# Patient Record
Sex: FEMALE | Race: WHITE | NOT HISPANIC OR LATINO | Employment: FULL TIME | URBAN - METROPOLITAN AREA
[De-identification: names, ages, dates, MRNs, and addresses within clinical notes are randomized per-mention and may not be internally consistent; named-entity substitution may affect disease eponyms.]

---

## 2022-05-24 ENCOUNTER — TRANSFERRED RECORDS (OUTPATIENT)
Dept: MULTI SPECIALTY CLINIC | Facility: CLINIC | Age: 62
End: 2022-05-24

## 2022-05-24 LAB — PAP-ABSTRACT: NORMAL

## 2023-05-30 LAB
ALT SERPL-CCNC: 21 U/L
AST SERPL-CCNC: 24 U/L
CREATININE (EXTERNAL): 0.85 MG/DL (ref 0.55–1.02)
GFR ESTIMATED (EXTERNAL): >60 ML/MIN/1.73M2
GLUCOSE (EXTERNAL): 103 MG/DL (ref 70–100)
POTASSIUM (EXTERNAL): 4.1 MMOL/L (ref 3.5–5.1)

## 2024-04-26 ENCOUNTER — TELEPHONE (OUTPATIENT)
Dept: FAMILY MEDICINE | Facility: CLINIC | Age: 64
End: 2024-04-26
Payer: COMMERCIAL

## 2024-04-26 DIAGNOSIS — I10 PRIMARY HYPERTENSION: Primary | ICD-10-CM

## 2024-04-26 PROBLEM — E78.00 HYPERCHOLESTEREMIA: Status: ACTIVE | Noted: 2022-03-10

## 2024-04-26 PROBLEM — G43.109 MIGRAINE HEADACHE WITH AURA: Status: ACTIVE | Noted: 2022-03-08

## 2024-04-26 PROBLEM — E03.9 HYPOTHYROIDISM: Status: ACTIVE | Noted: 2022-03-08

## 2024-04-26 RX ORDER — CARVEDILOL 25 MG/1
25 TABLET ORAL 2 TIMES DAILY
Qty: 180 TABLET | Refills: 0 | Status: SHIPPED | OUTPATIENT
Start: 2024-04-26 | End: 2024-05-10

## 2024-04-26 RX ORDER — CARVEDILOL 25 MG/1
25 TABLET ORAL 2 TIMES DAILY
COMMUNITY
Start: 2023-06-16 | End: 2024-04-26

## 2024-04-26 NOTE — TELEPHONE ENCOUNTER
Left detailed VM that 90-day Rx was sent in to [facility] [place], [place].     Tila Hayes on 4/26/2024 at 9:53 AM

## 2024-04-26 NOTE — TELEPHONE ENCOUNTER
Prescription sent x90 days. Further refills can be sent at upcoming appointment. Please let pt know  Alicia Che PA-C on 4/26/2024 at 9:45 AM

## 2024-04-26 NOTE — TELEPHONE ENCOUNTER
Medication Refill    What medication are you calling about (include dose and sig)?:   Carvedilol 25MG  1 tablet twice daily    Preferred Pharmacy:  [id number] - [place] - [address] AT [address]  P: [phone]  F: [phone]  Address    [address] [address]   Store number: [id number]   Near the intersection of: [address]    Operation       Hours: Not open 24 hours   E-Prescribing   E-Prescribing controlled substances   Mode: Retail   Type: External      Controlled Substance Agreement on file:   CSA -- Patient Level:    CSA: None found at the patient level.     Who prescribed the medication?:   Ashleigh Parra NP   [facility]    Do you need a refill? Yes    When did you use the medication last?   2 pills remaining - Out of medication    Patient offered an appointment? No  Scheduled to establish care 5/10/24.     Do you have any questions or concerns?  No    Could we send this information to you in [facility] or would you prefer to receive a phone call?:   Patient would prefer a phone call     Okay to leave a detailed message?: Yes at Cell number on file:    Telephone Information:   Mobile [phone]     Tila Hayes on 4/26/2024 at 9:38 AM

## 2024-05-10 ENCOUNTER — OFFICE VISIT (OUTPATIENT)
Dept: FAMILY MEDICINE | Facility: CLINIC | Age: 64
End: 2024-05-10
Payer: COMMERCIAL

## 2024-05-10 VITALS
OXYGEN SATURATION: 95 % | HEART RATE: 55 BPM | WEIGHT: 137 LBS | SYSTOLIC BLOOD PRESSURE: 136 MMHG | RESPIRATION RATE: 12 BRPM | DIASTOLIC BLOOD PRESSURE: 80 MMHG | TEMPERATURE: 98.2 F

## 2024-05-10 DIAGNOSIS — R21 RASH: ICD-10-CM

## 2024-05-10 DIAGNOSIS — G93.9 WHITE MATTER LESION OF CENTRAL NERVOUS SYSTEM: ICD-10-CM

## 2024-05-10 DIAGNOSIS — R79.89 ELEVATED SERUM CREATININE: ICD-10-CM

## 2024-05-10 DIAGNOSIS — H53.9 VISION CHANGES: ICD-10-CM

## 2024-05-10 DIAGNOSIS — G62.9 NEUROPATHY: ICD-10-CM

## 2024-05-10 DIAGNOSIS — E03.9 HYPOTHYROIDISM, UNSPECIFIED TYPE: Primary | ICD-10-CM

## 2024-05-10 DIAGNOSIS — I10 PRIMARY HYPERTENSION: ICD-10-CM

## 2024-05-10 DIAGNOSIS — E55.9 VITAMIN D DEFICIENCY: ICD-10-CM

## 2024-05-10 DIAGNOSIS — E78.00 HYPERCHOLESTEREMIA: ICD-10-CM

## 2024-05-10 DIAGNOSIS — R20.0 LEFT SIDED NUMBNESS: ICD-10-CM

## 2024-05-10 LAB
ALBUMIN SERPL BCG-MCNC: 4.3 G/DL (ref 3.5–5.2)
ALP SERPL-CCNC: 90 U/L (ref 40–150)
ALT SERPL W P-5'-P-CCNC: 22 U/L (ref 0–50)
ANION GAP SERPL CALCULATED.3IONS-SCNC: 10 MMOL/L (ref 7–15)
AST SERPL W P-5'-P-CCNC: 32 U/L (ref 0–45)
BILIRUB SERPL-MCNC: 0.3 MG/DL
BUN SERPL-MCNC: 9.5 MG/DL (ref 8–23)
CALCIUM SERPL-MCNC: 9.5 MG/DL (ref 8.8–10.2)
CHLORIDE SERPL-SCNC: 105 MMOL/L (ref 98–107)
CHOLEST SERPL-MCNC: 228 MG/DL
CREAT SERPL-MCNC: 1.11 MG/DL (ref 0.51–0.95)
DEPRECATED HCO3 PLAS-SCNC: 26 MMOL/L (ref 22–29)
EGFRCR SERPLBLD CKD-EPI 2021: 55 ML/MIN/1.73M2
ERYTHROCYTE [DISTWIDTH] IN BLOOD BY AUTOMATED COUNT: 13.1 % (ref 10–15)
FASTING STATUS PATIENT QL REPORTED: YES
FASTING STATUS PATIENT QL REPORTED: YES
GLUCOSE SERPL-MCNC: 97 MG/DL (ref 70–99)
HBA1C MFR BLD: 5.4 % (ref 0–5.6)
HCT VFR BLD AUTO: 42.5 % (ref 35–47)
HDLC SERPL-MCNC: 46 MG/DL
HGB BLD-MCNC: 14 G/DL (ref 11.7–15.7)
LDLC SERPL CALC-MCNC: 150 MG/DL
MCH RBC QN AUTO: 30.3 PG (ref 26.5–33)
MCHC RBC AUTO-ENTMCNC: 32.9 G/DL (ref 31.5–36.5)
MCV RBC AUTO: 92 FL (ref 78–100)
NONHDLC SERPL-MCNC: 182 MG/DL
PLATELET # BLD AUTO: 207 10E3/UL (ref 150–450)
POTASSIUM SERPL-SCNC: 4.2 MMOL/L (ref 3.4–5.3)
PROT SERPL-MCNC: 7 G/DL (ref 6.4–8.3)
RBC # BLD AUTO: 4.62 10E6/UL (ref 3.8–5.2)
SODIUM SERPL-SCNC: 141 MMOL/L (ref 135–145)
TRIGL SERPL-MCNC: 159 MG/DL
TSH SERPL DL<=0.005 MIU/L-ACNC: 1.28 UIU/ML (ref 0.3–4.2)
VIT B12 SERPL-MCNC: 498 PG/ML (ref 232–1245)
VIT D+METAB SERPL-MCNC: 43 NG/ML (ref 20–50)
WBC # BLD AUTO: 4.7 10E3/UL (ref 4–11)

## 2024-05-10 PROCEDURE — 80053 COMPREHEN METABOLIC PANEL: CPT | Performed by: PHYSICIAN ASSISTANT

## 2024-05-10 PROCEDURE — 36415 COLL VENOUS BLD VENIPUNCTURE: CPT | Performed by: PHYSICIAN ASSISTANT

## 2024-05-10 PROCEDURE — 82607 VITAMIN B-12: CPT | Performed by: PHYSICIAN ASSISTANT

## 2024-05-10 PROCEDURE — 84443 ASSAY THYROID STIM HORMONE: CPT | Performed by: PHYSICIAN ASSISTANT

## 2024-05-10 PROCEDURE — 99204 OFFICE O/P NEW MOD 45 MIN: CPT | Performed by: PHYSICIAN ASSISTANT

## 2024-05-10 PROCEDURE — 82306 VITAMIN D 25 HYDROXY: CPT | Performed by: PHYSICIAN ASSISTANT

## 2024-05-10 PROCEDURE — 80061 LIPID PANEL: CPT | Performed by: PHYSICIAN ASSISTANT

## 2024-05-10 PROCEDURE — 83036 HEMOGLOBIN GLYCOSYLATED A1C: CPT | Performed by: PHYSICIAN ASSISTANT

## 2024-05-10 PROCEDURE — 85027 COMPLETE CBC AUTOMATED: CPT | Performed by: PHYSICIAN ASSISTANT

## 2024-05-10 RX ORDER — MULTIVIT-MIN/IRON/FOLIC ACID/K 18-600-40
2000 CAPSULE ORAL DAILY
COMMUNITY
End: 2024-05-10

## 2024-05-10 RX ORDER — LEVOTHYROXINE SODIUM 75 MCG
75 TABLET ORAL DAILY
COMMUNITY
Start: 2024-04-22 | End: 2024-05-13

## 2024-05-10 RX ORDER — ACETAMINOPHEN 160 MG
2000 TABLET,DISINTEGRATING ORAL DAILY
COMMUNITY

## 2024-05-10 RX ORDER — IBUPROFEN 200 MG
TABLET ORAL
COMMUNITY

## 2024-05-10 RX ORDER — CARVEDILOL 25 MG/1
25 TABLET ORAL 2 TIMES DAILY
Qty: 180 TABLET | Refills: 3 | Status: SHIPPED | OUTPATIENT
Start: 2024-05-10

## 2024-05-10 ASSESSMENT — PAIN SCALES - GENERAL: PAINLEVEL: SEVERE PAIN (6)

## 2024-05-10 NOTE — Clinical Note
Please abstract the following data from this visit with this patient into the appropriate field in Epic:  Tests that can be patient reported without a hard copy:  Pap smear done on this date: 5/2022 (approximately), by this group: [facility], results were Normal.   Other Tests found in the patient's chart through Chart Review/Care Everywhere:  {Abstract Quality List (Optional):298470}  Note to Abstraction: If this section is blank, no results were found via Chart Review/Care Everywhere.

## 2024-05-10 NOTE — PROGRESS NOTES
Assessment & Plan     Hypothyroidism, unspecified type  Chronic issue. Stable on synthroid 75 mcg for several years. Recheck TSH and will refill prescription once results available, adjust dose if needed.   - TSH with free T4 reflex    Primary hypertension  Controlled on carvedilol, refill provided.   - refill carvedilol (COREG) 25 MG tablet  Dispense: 180 tablet; Refill: 3  - Comprehensive metabolic panel (BMP + Alb, Alk Phos, ALT, AST, Total. Bili, TP)  - CBC with platelets    Hypercholesteremia  Recheck lipids today.   - Lipid panel reflex to direct LDL Fasting    White matter lesion of central nervous system  Left sided numbness  Neuropathy  Vision changes  Has seen neurology in the past, ongoing symptoms of left sided neuropathic pain/numbness/tingling in setting of some white matter lesions noted on brain MRI in 2022, stable from 2019. Does have a history of migraines. Referred back to neurology for further evaluation and management. She did not tolerate gabapentin in the past for these symptoms due to GI side effects. Will check B12 and A1c as well. I also recommended seeing ophthal for vision changes she has noticed - she will check with insurance and let me know if referral is needed.   - Adult Neurology  Referral  - Vitamin B12  - Hemoglobin A1c    Vitamin D deficiency  History of low vit D level. On 2000 international unit(s) once daily currently   - Vitamin D Deficiency    Rash  Recurrent rash for several years. Most recently flared after having influenza. Is not itchy or bothersome. Advised monitoring for now and referred to derm per patient request.   - Adult Dermatology  Referral    Follow up in September/October for annual exam, patient declined any preventive care orders today due to wanting to focus on above issues.     Alicia Che PA-C on 5/10/2024 at 9:19 AM        Phillip Lau is a 64 year old, presenting for the following health issues:  Establish Care         5/10/2024     9:07 AM   Additional Questions   Roomed by Ramu CAMERON     History of Present Illness       Reason for visit:  Establish primary care    She eats 2-3 servings of fruits and vegetables daily.She consumes 1 sweetened beverage(s) daily.She exercises with enough effort to increase her heart rate 60 or more minutes per day.  She exercises with enough effort to increase her heart rate 7 days per week.   She is taking medications regularly.       Hypertension Follow-up    Do you check your blood pressure regularly outside of the clinic? No   Are you following a low salt diet? Yes  Are your blood pressures ever more than 140 on the top number (systolic) OR more   than 90 on the bottom number (diastolic), for example 140/90? NA    Hypothyroidism Follow-up    Since last visit, patient describes the following symptoms: Skin changes       Patient presents new to me to establish care    PMH reviewed:  - hypertension - on carvedilol 25 mg BID  - hypothyroidism - on synthroid 75 mcg daily, stable for awhile   - hyperlipidemia   - migraines w/ aura - has seen [facility] of neurology in the past, last in 10/2022. Coreg for prevention as well as hypertension treatment, does not tolerate triptans    Left sided numbness and burning pain noted in neuro notes from , EMG ordered but I don't see results from this. Started on gabapentin for neuropathy at that visit - horrible GI side effects. Has been told possible MS. Gets intermittent vision changes/blurring     Other issues:  Bladder spasms x6 months  Rash on torso since having influenza   Exhausted, doesn't sleep well. Works at [employer] - loves it  History of vit D def - on 2000 international unit(s) daily         Objective    /80   Pulse 55   Temp 98.2  F (36.8  C) (Tympanic)   Resp 12   Wt 62.1 kg (137 lb)   SpO2 95%   There is no height or weight on file to calculate BMI.  Physical Exam   GENERAL: alert and no distress  RESP: lungs clear to  auscultation - no rales, rhonchi or wheezes  CV: regular rate and rhythm, normal S1 S2, no S3 or S4, no murmur, click or rub, no peripheral edema  MS: no gross musculoskeletal defects noted, no edema  SKIN: no suspicious lesions or rashes  NEURO: Normal strength and tone, mentation intact and speech normal  PSYCH: mentation appears normal, affect normal/bright        Signed Electronically by: Alicia Che PA-C on 5/10/2024 at 9:19 AM

## 2024-05-13 ENCOUNTER — TELEPHONE (OUTPATIENT)
Dept: FAMILY MEDICINE | Facility: CLINIC | Age: 64
End: 2024-05-13
Payer: COMMERCIAL

## 2024-05-13 RX ORDER — LEVOTHYROXINE SODIUM 75 UG/1
75 TABLET ORAL DAILY
Qty: 90 TABLET | Refills: 3 | Status: SHIPPED | OUTPATIENT
Start: 2024-05-13

## 2024-05-13 NOTE — TELEPHONE ENCOUNTER
[facility] Call Center    Phone Message    May a detailed message be left on voicemail: no     Reason for Call: Other: Pt, Judi calling about Rash that pops up for 4-6 Weeks and then disappears and will come back/ same/ Torso & Back- Got it 1 month before Influenza/ Uses Neutrogena and Cerave soaps/ Please call if we can get in sooner- [phone]     Action Taken: Other: ec skin/ Derm    Travel Screening: Not Applicable

## 2024-05-13 NOTE — RESULT ENCOUNTER NOTE
Judi,    I have reviewed your lab results below:    - electrolytes, blood sugar and liver function normal   - mild stress on the kidneys - recommend avoiding NSAID medications (ibuprofen, advil, aleve, naproxen, etc) and making sure you are staying well hydrated, let's recheck in 1 month  - cholesterol is elevated - the American Heart Association recommends you be on a cholesterol medication to decrease risk of heart attack and stroke given your cholesterol readings and other risk factors. Please let me know if you are open to starting a cholesterol medication and I will send this to the pharmacy. If you'd like to discuss further, please schedule a video visit.   - thyroid function is within normal limits, continue same dose of thyroid medication. I sent a refill to your pharmacy for the year   - vitamin D level is normal   - vitamin B12 level is normal  - A1c (3 month average of blood sugars) is normal - no diabetes or prediabetes  - blood counts are normal - normal hemoglobin/red blood cells (no anemia), normal white blood cells (infection fighting cells), normal platelets (affect ability to clot normally)        Please let me know if you have any further questions.    Take care,  Alicia Che PA-C on 5/13/2024 at 8:33 AM

## 2024-05-14 NOTE — TELEPHONE ENCOUNTER
Patient Contact    Attempt # 1    Was call answered?  No.  Left message on voicemail with information to call nurse back at [phone].    Shy LUU RN  [facility] [place]  [phone]  .

## 2024-05-16 NOTE — TELEPHONE ENCOUNTER
S/w pt who states she gets a rash 2x/year and currently has the rash.  It will last for about 2 months and then goes away on its own.  Denies itching or pain with rash. Rheumatology thought it was psoriasis.    Scheduled with Meghna Kirk on Thursday May 23rd at 8:45 am.    Shy LUU RN  [facility] [place]  [phone]

## 2024-05-19 ENCOUNTER — HEALTH MAINTENANCE LETTER (OUTPATIENT)
Age: 64
End: 2024-05-19

## 2024-05-23 ENCOUNTER — OFFICE VISIT (OUTPATIENT)
Dept: FAMILY MEDICINE | Facility: CLINIC | Age: 64
End: 2024-05-23
Payer: COMMERCIAL

## 2024-05-23 DIAGNOSIS — R21 RASH: ICD-10-CM

## 2024-05-23 DIAGNOSIS — B35.1 ONYCHOMYCOSIS: Primary | ICD-10-CM

## 2024-05-23 PROCEDURE — 99204 OFFICE O/P NEW MOD 45 MIN: CPT | Performed by: PHYSICIAN ASSISTANT

## 2024-05-23 RX ORDER — CICLOPIROX 80 MG/ML
SOLUTION TOPICAL
Qty: 6.6 ML | Refills: 11 | Status: SHIPPED | OUTPATIENT
Start: 2024-05-23

## 2024-05-23 NOTE — PROGRESS NOTES
[facility] Dermatology Note  Encounter Date: May 23, 2024  Office Visit     Reviewed patients past medical history and pertinent chart review prior to patients visit today.     Dermatology Problem List:  # Rash, Ddx: pityriasis rosea vs eczematous dermatitis vs othe  # Onychomycosis   - Penlac     Social history: Enjoys walking her dog and biking  ____________________________________________    Assessment & Plan:     # History of a rash  Ddx: pityriasis rosea vs eczematous dermatitis vs other  - We discussed the non-specific findings on exam today. We discussed the limited utility of a biopsy, which the patient kindly declined today. We also reviewed the options of empiric treatment with triamcinolone if the rash flares again to address an eczematous process, which we will hold off on for now. I recommend the patient take photos and follow up if the rash flares again in the future.     # Onychomycosis   Onychomycosis present on the bilateral feet. We discussed the difficulty associated with treating fungal infections of the nails. We discussed treatment with oral medications, nail lacquer, as well as the option to not treat. The patient elected to treat.    Ciclopirox (Penlac) was prescribed and should be applied to the infected nail and surrounding skin once daily. After 7 days the nail lacquer should be removed with alcohol and a fresh coat applied. Patient was advised used of the nail lacquer should continue until nail clearance is achieved or up to 48 weeks.      All risks, benefits and alternatives were discussed with patient.  Patient is in agreement and understands the assessment and plan.  All questions were answered.  Meghna Kirk PA-C  [facility]  _______________________________________    CC: Consult (Rash - on torso )    HPI:  Ms. Judi Leavitt is a(n) 64 year old female who presents today as a new patient for a rash. The rash has flares about two times per year  in the past few years. The rash lasts a few weeks before resolving. The rash involves the back, chest, and abdomen and is red and scaly, no pustules or ulcers. No itching or pain. The patient has not tried anything for the rash. Denies any medications, exposures or products preceding the rash. The rash has flared while the patient was sick in the past. The patient reports a history of eczema and sensitive skin. The rash last flared weeks ago and is almost completely resolved per patient.     Additionally, she wonders what options are available for toenail fungus. Patient is otherwise feeling well, without additional skin concerns.      Physical Exam:  SKIN: Focused examination of back, chest, arms and abdomen was performed.  - Fine scale with mild background erythema involving the midline lumbar back.   - Few papules with crust involving the bilateral upper back.   - Yellowing with subungual debris involving the toenail(s).     - No other lesions of concern on areas examined.         Medications:  Current Outpatient Medications   Medication Sig Dispense Refill    carvedilol (COREG) 25 MG tablet Take 1 tablet (25 mg) by mouth 2 times daily 180 tablet 3    Cholecalciferol (VITAMIN D3) 50 MCG (2000 UT) CAPS Take 2,000 Units by mouth daily      levothyroxine (SYNTHROID) 75 MCG tablet Take 1 tablet (75 mcg) by mouth daily 90 tablet 3    ibuprofen (ADVIL/MOTRIN) 200 MG tablet 600mg in the AM and 800mg in the PM       No current facility-administered medications for this visit.      Past Medical History:   Patient Active Problem List   Diagnosis    Migraine headache with aura    Hypothyroidism    Hypercholesteremia    HTN (hypertension)    Left sided numbness     History reviewed. No pertinent past medical history.    CC Referred MD Gr[place]  No address on file on close of this encounter.

## 2024-05-23 NOTE — LETTER
5/23/2024         RE: Judi Leavitt  [address]  [place] [address]        Dear Colleague,    Thank you for referring your patient, Judi Leavitt, to the [facility] [place]. Please see a copy of my visit note below.    [facility] Dermatology Note  Encounter Date: May 23, 2024  Office Visit     Reviewed patients past medical history and pertinent chart review prior to patients visit today.     Dermatology Problem List:  # Rash, Ddx: pityriasis rosea vs eczematous dermatitis vs othe  # Onychomycosis   - Penlac     Social history: Enjoys walking her dog and biking  ____________________________________________    Assessment & Plan:     # History of a rash  Ddx: pityriasis rosea vs eczematous dermatitis vs other  - We discussed the non-specific findings on exam today. We discussed the limited utility of a biopsy, which the patient kindly declined today. We also reviewed the options of empiric treatment with triamcinolone if the rash flares again to address an eczematous process, which we will hold off on for now. I recommend the patient take photos and follow up if the rash flares again in the future.     # Onychomycosis   Onychomycosis present on the bilateral feet. We discussed the difficulty associated with treating fungal infections of the nails. We discussed treatment with oral medications, nail lacquer, as well as the option to not treat. The patient elected to treat.    Ciclopirox (Penlac) was prescribed and should be applied to the infected nail and surrounding skin once daily. After 7 days the nail lacquer should be removed with alcohol and a fresh coat applied. Patient was advised used of the nail lacquer should continue until nail clearance is achieved or up to 48 weeks.      All risks, benefits and alternatives were discussed with patient.  Patient is in agreement and understands the assessment and plan.  All questions were answered.  Meghna  CONNER Kirk  [facility]at[facility]  _______________________________________    CC: Consult (Rash - on torso )    HPI:  Ms. Judi Leavitt is a(n) 64 year old female who presents today as a new patient for a rash. The rash has flares about two times per year in the past few years. The rash lasts a few weeks before resolving. The rash involves the back, chest, and abdomen and is red and scaly, no pustules or ulcers. No itching or pain. The patient has not tried anything for the rash. Denies any medications, exposures or products preceding the rash. The rash has flared while the patient was sick in the past. The patient reports a history of eczema and sensitive skin. The rash last flared weeks ago and is almost completely resolved per patient.     Additionally, she wonders what options are available for toenail fungus. Patient is otherwise feeling well, without additional skin concerns.      Physical Exam:  SKIN: Focused examination of back, chest, arms and abdomen was performed.  - Fine scale with mild background erythema involving the midline lumbar back.   - Few papules with crust involving the bilateral upper back.   - Yellowing with subungual debris involving the toenail(s).     - No other lesions of concern on areas examined.         Medications:  Current Outpatient Medications   Medication Sig Dispense Refill     carvedilol (COREG) 25 MG tablet Take 1 tablet (25 mg) by mouth 2 times daily 180 tablet 3     Cholecalciferol (VITAMIN D3) 50 MCG (2000 UT) CAPS Take 2,000 Units by mouth daily       levothyroxine (SYNTHROID) 75 MCG tablet Take 1 tablet (75 mcg) by mouth daily 90 tablet 3     ibuprofen (ADVIL/MOTRIN) 200 MG tablet 600mg in the AM and 800mg in the PM       No current facility-administered medications for this visit.      Past Medical History:   Patient Active Problem List   Diagnosis     Migraine headache with aura     Hypothyroidism     Hypercholesteremia     HTN (hypertension)     Left sided  numbness     History reviewed. No pertinent past medical history.    CC Referred Self, MD  No address on file on close of this encounter.       Again, thank you for allowing me to participate in the care of your patient.        Sincerely,        Meghna Kirk PA-C

## 2024-05-23 NOTE — PATIENT INSTRUCTIONS
Patient Education       Proper skin care from [facility]:    -Eliminate harsh soaps as they strip the natural oils from the skin, often resulting in dry itchy skin ( i.e. Dial, Zest, Faroese Spring)  -Use mild soaps such as Cetaphil or Dove Sensitive Skin in the shower. You do not need to use soap on arms, legs, and trunk every time you shower unless visibly soiled.   -Avoid hot or cold showers.  -After showering, lightly dry off and apply moisturizing within 2-3 minutes. This will help trap moisture in the skin.   -Aggressive use of a moisturizer at least 1-2 times a day to the entire body (including -Vanicream, Cetaphil, Aquaphor or Cerave) and moisturize hands after every washing.  -We recommend using moisturizers that come in a tub that needs to be scooped out, not a pump. This has more of an oil base. It will hold moisture in your skin much better than a water base moisturizer. The above recommended are non-pore clogging.      Wear a sunscreen with at least SPF 30 on your face, ears, neck and V of the chest daily. Wear sunscreen on other areas of the body if those areas are exposed to the sun throughout the day. Sunscreens can contain physical and/or chemical blockers. Physical blockers are less likely to clog pores, these include zinc oxide and titanium dioxide. Reapply every two hour and after swimming.     Sunscreen examples: https://www.ewg.org/sunscreen/    UV radiation  UVA radiation remains constant throughout the day and throughout the year. It is a longer wavelength than UVB and therefore penetrates deeper into the skin leading to immediate and delayed tanning, photoaging, and skin cancer. 70-80% of UVA and UVB radiation occurs between the hours of 10am-2pm.  UVB radiation  UVB radiation causes the most harmful effects and is more significant during the summer months. However, snow and ice can reflect UVB radiation leading to skin damage during the winter months as well. UVB radiation is  responsible for tanning, burning, inflammation, delayed erythema (pinkness), pigmentation (brown spots), and skin cancer.     I recommend self monthly full body exams and yearly full body exams with a dermatology provider. If you develop a new or changing lesion please follow up for examination. Most skin cancers are pink and scaly or pink and pearly. However, we do see blue/brown/black skin cancers.  Consider the ABCDEs of melanoma when giving yourself your monthly full body exam ( don't forget the groin, buttocks, feet, toes, etc). A-asymmetry, B-borders, C-color, D-diameter, E-elevation or evolving. If you see any of these changes please follow up in clinic. If you cannot see your back I recommend purchasing a hand held mirror to use with a larger wall mirror.       Checking for Skin Cancer  You can find cancer early by checking your skin each month. There are 3 kinds of skin cancer. They are melanoma, basal cell carcinoma, and squamous cell carcinoma. Doing monthly skin checks is the best way to find new marks or skin changes. Follow the instructions below for checking your skin.   The ABCDEs of checking moles for melanoma   Check your moles or growths for signs of melanoma using ABCDE:   Asymmetry: the sides of the mole or growth don t match  Border: the edges are ragged, notched, or blurred  Color: the color within the mole or growth varies  Diameter: the mole or growth is larger than 6 mm (size of a pencil eraser)  Evolving: the size, shape, or color of the mole or growth is changing (evolving is not shown in the images below)    Checking for other types of skin cancer  Basal cell carcinoma or squamous cell carcinoma have symptoms such as:     A spot or mole that looks different from all other marks on your skin  Changes in how an area feels, such as itching, tenderness, or pain  Changes in the skin's surface, such as oozing, bleeding, or scaliness  A sore that does not heal  New swelling or redness beyond  the border of a mole    Who s at risk?  Anyone can get skin cancer. But you are at greater risk if you have:   Fair skin, light-colored hair, or light-colored eyes  Many moles or abnormal moles on your skin  A history of sunburns from sunlight or tanning beds  A family history of skin cancer  A history of exposure to radiation or chemicals  A weakened immune system  If you have had skin cancer in the past, you are at risk for recurring skin cancer.   How to check your skin  Do your monthly skin checkups in front of a full-length mirror. Check all parts of your body, including your:   Head (ears, face, neck, and scalp)  Torso (front, back, and sides)  Arms (tops, undersides, upper, and lower armpits)  Hands (palms, backs, and fingers, including under the nails)  Buttocks and genitals  Legs (front, back, and sides)  Feet (tops, soles, toes, including under the nails, and between toes)  If you have a lot of moles, take digital photos of them each month. Make sure to take photos both up close and from a distance. These can help you see if any moles change over time.   Most skin changes are not cancer. But if you see any changes in your skin, call your doctor right away. Only he or she can diagnose a problem. If you have skin cancer, seeing your doctor can be the first step toward getting the treatment that could save your life.   "[employer]" last reviewed this educational content on 4/1/2019 2000-2020 [employer]. [address] [place] [address], [place] [address]. All rights reserved. This information is not intended as a substitute for professional medical care. Always follow your healthcare professional's instructions.       When should I call my doctor?  If you are worsening or not improving, please, contact us or seek urgent care as noted below.     Who should I call with questions (adults)?  [facility] (adult and pediatric): [phone]  [facility]  [place] (adult): [phone]  [facility] ([place], [place], [place] and [place]) [phone]  For urgent needs outside of business hours call the [facility] at [phone] and ask for the dermatology resident on call to be paged  If this is a medical emergency and you are unable to reach an ER, Call 911      If you need a prescription refill, please contact your pharmacy. Refills are approved or denied by our Physicians during normal business hours, Monday through Fridays  Per office policy, refills will not be granted if you have not been seen within the past year (or sooner depending on your child's condition)

## 2024-06-13 ENCOUNTER — DOCUMENTATION ONLY (OUTPATIENT)
Dept: FAMILY MEDICINE | Facility: CLINIC | Age: 64
End: 2024-06-13
Payer: COMMERCIAL

## 2024-06-13 DIAGNOSIS — E78.00 HYPERCHOLESTEREMIA: Primary | ICD-10-CM

## 2024-06-13 DIAGNOSIS — R79.89 ELEVATED SERUM CREATININE: ICD-10-CM

## 2024-06-13 NOTE — PROGRESS NOTES
Judi Leavitt has an upcoming lab appointment:    Future Appointments   Date Time Provider Department Center   6/24/2024 10:30 AM [facility] LAB [facility]   7/10/2024  9:00 AM Hussain Young MD [facility] CS     Patient is scheduled for the following lab(s): repeat kidney function (per Alicia Che), UA, cholesterol    There is no order available. Please review and place either future orders or HMPO (Review of Health Maintenance Protocol Orders), as appropriate.    Health Maintenance Due   Topic    HIV SCREENING      Denise Malone

## 2024-06-24 ENCOUNTER — LAB (OUTPATIENT)
Dept: LAB | Facility: CLINIC | Age: 64
End: 2024-06-24
Attending: PHYSICIAN ASSISTANT
Payer: COMMERCIAL

## 2024-06-24 ENCOUNTER — TELEPHONE (OUTPATIENT)
Dept: FAMILY MEDICINE | Facility: CLINIC | Age: 64
End: 2024-06-24

## 2024-06-24 DIAGNOSIS — Z11.4 SCREENING FOR HIV (HUMAN IMMUNODEFICIENCY VIRUS): Primary | ICD-10-CM

## 2024-06-24 DIAGNOSIS — R79.89 ELEVATED SERUM CREATININE: ICD-10-CM

## 2024-06-24 DIAGNOSIS — E78.00 HYPERCHOLESTEREMIA: ICD-10-CM

## 2024-06-24 DIAGNOSIS — R79.89 ELEVATED SERUM CREATININE: Primary | ICD-10-CM

## 2024-06-24 LAB
ALBUMIN UR-MCNC: NEGATIVE MG/DL
ANION GAP SERPL CALCULATED.3IONS-SCNC: 11 MMOL/L (ref 7–15)
APPEARANCE UR: CLEAR
BILIRUB UR QL STRIP: NEGATIVE
BUN SERPL-MCNC: 13.4 MG/DL (ref 8–23)
CALCIUM SERPL-MCNC: 9.7 MG/DL (ref 8.8–10.2)
CHLORIDE SERPL-SCNC: 104 MMOL/L (ref 98–107)
CHOLEST SERPL-MCNC: 225 MG/DL
COLOR UR AUTO: YELLOW
CREAT SERPL-MCNC: 0.95 MG/DL (ref 0.51–0.95)
CREAT UR-MCNC: 74.5 MG/DL
DEPRECATED HCO3 PLAS-SCNC: 23 MMOL/L (ref 22–29)
EGFRCR SERPLBLD CKD-EPI 2021: 66 ML/MIN/1.73M2
FASTING STATUS PATIENT QL REPORTED: YES
FASTING STATUS PATIENT QL REPORTED: YES
GLUCOSE SERPL-MCNC: 99 MG/DL (ref 70–99)
GLUCOSE UR STRIP-MCNC: NEGATIVE MG/DL
HDLC SERPL-MCNC: 50 MG/DL
HGB UR QL STRIP: ABNORMAL
HOLD SPECIMEN: NORMAL
KETONES UR STRIP-MCNC: NEGATIVE MG/DL
LDLC SERPL CALC-MCNC: 139 MG/DL
LEUKOCYTE ESTERASE UR QL STRIP: NEGATIVE
MICROALBUMIN UR-MCNC: <12 MG/L
MICROALBUMIN/CREAT UR: NORMAL MG/G{CREAT}
NITRATE UR QL: NEGATIVE
NONHDLC SERPL-MCNC: 175 MG/DL
PH UR STRIP: 6 [PH] (ref 5–7)
POTASSIUM SERPL-SCNC: 4.1 MMOL/L (ref 3.4–5.3)
RBC #/AREA URNS AUTO: ABNORMAL /HPF
SODIUM SERPL-SCNC: 138 MMOL/L (ref 135–145)
SP GR UR STRIP: 1.01 (ref 1–1.03)
SQUAMOUS #/AREA URNS AUTO: ABNORMAL /LPF
TRIGL SERPL-MCNC: 179 MG/DL
UROBILINOGEN UR STRIP-ACNC: 0.2 E.U./DL
WBC #/AREA URNS AUTO: ABNORMAL /HPF

## 2024-06-24 PROCEDURE — 81001 URINALYSIS AUTO W/SCOPE: CPT

## 2024-06-24 PROCEDURE — 80048 BASIC METABOLIC PNL TOTAL CA: CPT

## 2024-06-24 PROCEDURE — 36415 COLL VENOUS BLD VENIPUNCTURE: CPT

## 2024-06-24 PROCEDURE — 82043 UR ALBUMIN QUANTITATIVE: CPT

## 2024-06-24 PROCEDURE — 87389 HIV-1 AG W/HIV-1&-2 AB AG IA: CPT

## 2024-06-24 PROCEDURE — 80061 LIPID PANEL: CPT

## 2024-06-24 PROCEDURE — 82570 ASSAY OF URINE CREATININE: CPT

## 2024-06-24 NOTE — TELEPHONE ENCOUNTER
Called patient and relayed providers message. Patient stated understanding and had no further questions.     Ivone CARMONA RN  [facility] Triage Team

## 2024-06-24 NOTE — TELEPHONE ENCOUNTER
Pt had lab only visit today and left a urine specimen in case UA is needed given abnormal labs results 05/10/2024. Pt denies dysuria, back/abdominal pain, fever or hematuria. Pt  has urinary frequency bladder spasms for 6 months .    GEORGI Vargas 05/10 lab note-  - mild stress on the kidneys - recommend avoiding NSAID medications (ibuprofen, advil, aleve, naproxen, etc) and making sure you are staying well hydrated, let's recheck in 1 month     Please advice if covering provider can approve UA/UC or other renal related lab orders.    Pt will need call back with providers advice. VM is confidential.

## 2024-06-25 ENCOUNTER — MYC MEDICAL ADVICE (OUTPATIENT)
Dept: FAMILY MEDICINE | Facility: CLINIC | Age: 64
End: 2024-06-25
Payer: COMMERCIAL

## 2024-06-25 LAB — HIV 1+2 AB+HIV1 P24 AG SERPL QL IA: NONREACTIVE

## 2024-06-25 NOTE — TELEPHONE ENCOUNTER
Thank you for reaching us.  Alicia  is not in office so I am trying to reply back on her behalf.  HIV test is sometimes done as a screening test for everyone ,once in a lifetime that could be the reason she has ordered that.  There is no specific reason other than screening exam.    Ming Meehan MD

## 2024-07-10 ENCOUNTER — OFFICE VISIT (OUTPATIENT)
Dept: NEUROLOGY | Facility: CLINIC | Age: 64
End: 2024-07-10
Attending: PHYSICIAN ASSISTANT
Payer: COMMERCIAL

## 2024-07-10 VITALS
BODY MASS INDEX: 18.56 KG/M2 | SYSTOLIC BLOOD PRESSURE: 158 MMHG | DIASTOLIC BLOOD PRESSURE: 93 MMHG | WEIGHT: 137 LBS | OXYGEN SATURATION: 100 % | HEIGHT: 72 IN | HEART RATE: 57 BPM

## 2024-07-10 DIAGNOSIS — G62.9 NEUROPATHY: ICD-10-CM

## 2024-07-10 DIAGNOSIS — G93.9 WHITE MATTER LESION OF CENTRAL NERVOUS SYSTEM: ICD-10-CM

## 2024-07-10 DIAGNOSIS — G43.E19 CHRONIC MIGRAINE WITH AURA, INTRACTABLE, WITHOUT STATUS MIGRAINOSUS: Primary | ICD-10-CM

## 2024-07-10 DIAGNOSIS — R20.0 LEFT SIDED NUMBNESS: ICD-10-CM

## 2024-07-10 PROCEDURE — G2211 COMPLEX E/M VISIT ADD ON: HCPCS | Performed by: PSYCHIATRY & NEUROLOGY

## 2024-07-10 PROCEDURE — 99204 OFFICE O/P NEW MOD 45 MIN: CPT | Performed by: PSYCHIATRY & NEUROLOGY

## 2024-07-10 NOTE — PATIENT INSTRUCTIONS
Your examination today shows a mild neuropathy at BOTH feet.  What I am not so sure is whether the neuropathy has spread to your hands, and especially if the left side's neuropathy is worse than the right.  Those are important questions to figure out, because it could affect my final diagnosis.    I would like you to get a few blood tests at your primary care clinic for common causes of neuropathy, and an EMG test. The wait times for EMG in [place] are currently very long, and you may consider doing it in another location if you wanted to be done before September or October. The phone number of the EMG lab is     Please sign a release of records form today so we can obtain your last brain and cervical spine MRI from [facility].  I need to review the scans in person.    I would like you to see one of our migraine specialist as well.    Follow-up in our clinic in 4 months

## 2024-07-10 NOTE — LETTER
7/10/2024      Judi Leavitt  [address]  [place] [address]      Dear Colleague,    Thank you for referring your patient, Judi Leavitt, to the [facility] NEUROLOGY [facility]. Please see a copy of my visit note below.    TITI Adams (referring provider)  [place], July 10, 2024    Dear Ms Che,    I had the pleasure to see Ms. Leavitt at the [facility] neuromuscular clinic in [place] today.  She is a 64-year-old woman with history of chronic migraine headaches with visual auras, for which she used to follow at [facility] until December 2022.  She is here mostly because of feeling of numbness and pain on both sides of the body, but mostly the left.  This problem began around 5868-4186.  At that time, she had intractable migraine headaches and frequent visual auras described as flashing lights, fractured visual field, shards, or black spots.  She woke up 1 day and her left side was numb including the chest, leg, and arm, with pain experienced in the same region.  The sensory symptoms evolved over a few hours.  She was seen in the local ED and no specific diagnosis was made.  Since that time, she has had frequent episodes of dysesthesias of her left half of the body.  In the last years, she has similar sensations to a lesser degree on the right side as well.  It seems that the sensory symptoms are not constant but rather intermittent/fluctuating.  They tend to happen also at night and she has to wake up and take a few steps and walk around to get relief.  She may have a little feeling of restlessness of her legs when she sits down watching TV as well, but it is not very prominent.  She is experience some spasms of her bladder and she has not seen urology recently.  In the past she had numerous UTIs but not in the last year.  She is suffering from urgency of urination.  3 months ago she had influenza A and this led to some temporary  increase in her creatinine, which later improved.  She also describes change in her visual symptoms in the last year with occasional episodes of double vision or feeling out of focus.    As above said, the episodes of numbness are more severe and frequent on the left side.  Her fourth and fifth finger of the left hand will frequently lock up/freeze, and she occasionally will drop things.    She describes dry eyes and dry mouth, for which she needs to use daily drops.  She does not have a history of diabetes.  She drinks 1-2 alcoholic drinks on the weekends but not during the week.  She does eat red meat once to twice a month, but mostly fruits and vegetables.  She never had cancer, radiation, chemotherapy, or bariatric surgery.  There is a family history of type 1 diabetes (son), and thyroid disorders.  She is on thyroid replacement for years.    The migraine headaches bother her less in the last year, but they still occur, and she has headaches for more than 15 days every month.  The old visual auras she had continued to occur periodically.  She has not seen a headache specialist recently.    Of note, she had a brain MRI at [facility] in October 20[facility].  There was scattered T2 hyperintense white matter lesions.  The radiologist said that they may be compatible with intracranial demyelination or migraines or remote insults.  He noted that there was no change in MRI compared to October 2019.  It should be pointed out however, that the previous radiologist read the 2019 scan was not concerned about demyelination.  MRI cervical and thoracic spine done also in October 2022 did not show any concerning spinal cord lesion.  CT of the abdomen and pelvis done in May 2023 showed a right ovarian cyst of 4 cm.  In May 2024 she had hemoglobin A1c, vitamin B12, vitamin D levels, TSH, CBC, comprehensive metabolic panel, and HIV serologies.  They were all negative or normal.  She  was previously tested for hepatitis C in 2022 and it was negative. She has not had an EMG study.      Current Outpatient Medications   Medication Sig Dispense Refill     carvedilol (COREG) 25 MG tablet Take 1 tablet (25 mg) by mouth 2 times daily 180 tablet 3     Cholecalciferol (VITAMIN D3) 50 MCG (2000 UT) CAPS Take 2,000 Units by mouth daily       ciclopirox (PENLAC) 8 % external solution Apply to adjacent skin and affected nails daily.  Remove with alcohol every 7 days, then repeat. 6.6 mL 11     ibuprofen (ADVIL/MOTRIN) 200 MG tablet 600mg in the AM and 800mg in the PM       levothyroxine (SYNTHROID) 75 MCG tablet Take 1 tablet (75 mcg) by mouth daily 90 tablet 3     No current facility-administered medications for this visit.          Allergies   Allergen Reactions     Corticosteroids      Gabapentin GI Disturbance     Indigestion, bloating, pain.     Levofloxacin Confusion     Prednisone Other (See Comments)     Psychotic reaction     BP (!) 158/93   Pulse 57   Ht 1.829 m (6')   Wt 62.1 kg (137 lb)   SpO2 100%   BMI 18.58 kg/m      EXAM: She is awake, alert, oriented x 3, and capable of providing a coherent history.  Cranial nerve examination shows no ptosis, ophthalmoparesis, or nystagmus.  Saccades are normal.  There is no weakness of orbicularis oculi, orbicularis oris, uvula, jaw, palate, or tongue.  There is no tongue atrophy or fasciculations.  There is no dysphonia or dysarthria.  She has a weakly positive jaw jerk.  Facial sensation shows mild asymmetry with reduced perception of light touch and vibration at the left side of the face and forehead.  The sensory loss seems to split the midline.    Strength is 5/5 in upper and lower extremities proximally and distally.  Muscle tone is normal.  Reflexes are 3+ and symmetric at the upper extremities, 2+ to 3+ and symmetric at the knees, and absent at the ankles.  Plantar responses are mute.  She does have a weak bilateral Cameron sign.      Sensory  exam shows mildly reduced vibration at the toes, although it is present.  It was about 8 seconds on the right and 6 on the left without major asymmetry.  Vibration at the medial malleoli was 10 seconds on both sides.  Vibration at the index fingers was a bit asymmetric, 14 seconds on the right, and no more than 7 on the left, which is abnormal.  Joint position sensation was preserved at the toes.  Pinprick was reduced at the toes and dorsum of the feet, with more normal perception of the level of the mid knees bilaterally.  There was perhaps mild asymmetry with reduced pinprick and light touch sensation of the left foot, toes, and lateral calf, compared to the right.     Agility of finger and foot tapping is normal.  There is no dysmetria on finger-to-nose.  She can get up from a chair with arms crossed on the chest without difficulty.  Casual gait looks normal.  Romberg sign is negative.  She is capable of doing 4 steps of tandem gait, with mild hesitancy.    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  There is some mild asymmetry in her sensory exam, but I really want to know if there is objective asymmetry of her sensory responses, thus it is imperative to do an EMG study.  The importance of asymmetric objective peripheral sensory dysfunction, especially if it involves the upper extremities, is that it would suggest a sensory ganglionopathy, and not a length-dependent sensory neuropathy.  The differential diagnosis for sensory ganglionopathy is different than length dependent neuropathy, and includes a number of autoimmune and paraneoplastic diseases.    I will get some labs for common causes of neuropathy including serum protein immunofixation, CRP, JAMAL, and FLORINA antibody panel.  I will also check celiac disease serologies.  She already  had B12, TSH, hemoglobin A1c, and HIV checked-negative.  If the EMG suggests sensory ganglionopathy, her workup will be further expanded.    I would like to review her brain and cervical spine MRI images in person.  We will call [facility] and asked them to push the images into PACS.  White matter lesions are very common in brain MRIs and they are especially common in people with chronic migraine or hypertension.  This is 10 times more common than demyelinating disease, and if the shape, size, and location of the lesions in our opinion does not raise high concerns about demyelination, I would not recommend investigating it further.    I would like the patient to see one of our migraine/headache specialist, and I will place a referral today.    She will return to the neuromuscular clinic for follow-up in 4 months, sooner if needed.    Sincerely,    Hussain Young MD, FAAN    Total time spent on this encounter today 55 minutes of which 35 face-to-face, 10 in postvisit note dictation, editing, and orders, and 10 in previsit chart review.    The longitudinal plan of care for the diagnosis(es)/condition(s) as documented were addressed during this visit. Due to the added complexity in care, I will continue to support Judi in the subsequent management and with ongoing continuity of care.            Again, thank you for allowing me to participate in the care of your patient.        Sincerely,        Hussain Young MD

## 2024-07-10 NOTE — PROGRESS NOTES
TITI Adams (referring provider)  [place], July 10, 2024    Dear Ms Che,    I had the pleasure to see Ms. Leavitt at the [facility] neuromuscular clinic in [place] today.  She is a 64-year-old woman with history of chronic migraine headaches with visual auras, for which she used to follow at [facility] until December 2022.  She is here mostly because of feeling of numbness and pain on both sides of the body, but mostly the left.  This problem began around 3042-2124.  At that time, she had intractable migraine headaches and frequent visual auras described as flashing lights, fractured visual field, shards, or black spots.  She woke up 1 day and her left side was numb including the chest, leg, and arm, with pain experienced in the same region.  The sensory symptoms evolved over a few hours.  She was seen in the local ED and no specific diagnosis was made.  Since that time, she has had frequent episodes of dysesthesias of her left half of the body.  In the last years, she has similar sensations to a lesser degree on the right side as well.  It seems that the sensory symptoms are not constant but rather intermittent/fluctuating.  They tend to happen also at night and she has to wake up and take a few steps and walk around to get relief.  She may have a little feeling of restlessness of her legs when she sits down watching TV as well, but it is not very prominent.  She is experience some spasms of her bladder and she has not seen urology recently.  In the past she had numerous UTIs but not in the last year.  She is suffering from urgency of urination.  3 months ago she had influenza A and this led to some temporary increase in her creatinine, which later improved.      She also describes change in her visual symptoms in the last year with occasional episodes of double vision or feeling out of focus (which is new).    As above said, the episodes of numbness are more  severe and frequent on the left side.  Her fourth and fifth finger of the left hand will frequently lock up/freeze, and she occasionally will drop things.    She describes dry eyes and dry mouth, for which she needs to use daily drops.  She does not have a history of diabetes.  She drinks 1-2 alcoholic drinks on the weekends but not during the week.  She does eat red meat once to twice a month, but mostly fruits and vegetables.  She never had cancer, radiation, chemotherapy, or bariatric surgery.  There is a family history of type 1 diabetes (son), and thyroid disorders.  She is on thyroid replacement for years.    The migraine headaches bother her less in the last year, but they still occur, and she has headaches for more than 15 days every month.  The old visual auras she used to have, continue to occur periodically.  She has not seen a headache specialist recently.    Of note, she had a brain MRI at [facility] in [facility] in October 2022.  There was scattered T2 hyperintense white matter lesions.  The radiologist said that they may be compatible with intracranial demyelination or migraines or remote vascular insults.  He noted that there was no change in MRI compared to October 2019.  It should be pointed out however, that the previous radiologist, who read the 2019 scan, was not concerned about demyelination.  MRI cervical and thoracic spine done also in October 2022 did not show any concerning spinal cord lesion.  CT of the abdomen and pelvis done in May 2023 showed a right ovarian cyst of 4 cm.  In May 2024 she had hemoglobin A1c, vitamin B12, vitamin D levels, TSH, CBC, comprehensive metabolic panel, and HIV serologies.  They were all negative or normal.  She was previously tested for hepatitis C in 2022 and it was negative. She has not had an EMG study.      Current Outpatient Medications   Medication Sig Dispense Refill    carvedilol (COREG) 25 MG tablet Take 1  tablet (25 mg) by mouth 2 times daily 180 tablet 3    Cholecalciferol (VITAMIN D3) 50 MCG (2000 UT) CAPS Take 2,000 Units by mouth daily      ciclopirox (PENLAC) 8 % external solution Apply to adjacent skin and affected nails daily.  Remove with alcohol every 7 days, then repeat. 6.6 mL 11    ibuprofen (ADVIL/MOTRIN) 200 MG tablet 600mg in the AM and 800mg in the PM      levothyroxine (SYNTHROID) 75 MCG tablet Take 1 tablet (75 mcg) by mouth daily 90 tablet 3     No current facility-administered medications for this visit.          Allergies   Allergen Reactions    Corticosteroids     Gabapentin GI Disturbance     Indigestion, bloating, pain.    Levofloxacin Confusion    Prednisone Other (See Comments)     Psychotic reaction     BP (!) 158/93   Pulse 57   Ht 1.829 m (6')   Wt 62.1 kg (137 lb)   SpO2 100%   BMI 18.58 kg/m      EXAM: She is awake, alert, oriented x 3, and capable of providing a coherent history.  Cranial nerve examination shows no ptosis, ophthalmoparesis, or nystagmus.  Saccades are normal.  There is no weakness of orbicularis oculi, orbicularis oris, uvula, jaw, palate, or tongue.  There is no tongue atrophy or fasciculations.  There is no dysphonia or dysarthria.  She has a weakly positive jaw jerk.  Facial sensation shows mild asymmetry with reduced perception of light touch and vibration at the left side of the face and forehead.  The sensory loss seems to split the midline.    Strength is 5/5 in upper and lower extremities proximally and distally.  Muscle tone is normal.  Reflexes are 3+ and symmetric at the upper extremities, 2+ to 3+ and symmetric at the knees, and absent at the ankles.  Plantar responses are mute.  She does have a weak bilateral Cameron sign.      Sensory exam shows mildly reduced vibration at the toes, although it is present.  It was about 8 seconds on the right and 6 on the left without major asymmetry.  Vibration at the medial malleoli was 10 seconds on both sides.   Vibration at the index fingers was a bit asymmetric, 14 seconds on the right, and no more than 7 on the left, which is abnormal.  Joint position sensation was preserved at the toes.  Pinprick was reduced at the toes and dorsum of the feet, with more normal perception of the level of the mid knees bilaterally.  There was perhaps mild asymmetry with reduced pinprick and light touch sensation of the left foot, toes, and lateral calf, compared to the right.     Agility of finger and foot tapping is normal.  There is no dysmetria on finger-to-nose.  She can get up from a chair with arms crossed on the chest without difficulty.  Casual gait looks normal.  Romberg sign is negative.  She is capable of doing 4 steps of tandem gait, with mild hesitancy.    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  There is some asymmetry in her sensory exam, but I really want to know if there is electrophysiologic evidence of asymmetry of her sensory responses, thus it is imperative to do an EMG study.  The importance of asymmetric objective peripheral sensory dysfunction, especially if it involves the upper extremities, is that it would suggest a sensory ganglionopathy, and not a length-dependent sensory neuropathy.  The differential diagnosis for sensory ganglionopathy is different than length dependent neuropathy, and includes a number of autoimmune and paraneoplastic diseases.    I will get labs for common causes of neuropathy that were not previously checked, including serum protein immunofixation, CRP, JAMAL, and FLORINA antibody panel.  I will also check celiac disease serologies.  She already had B12, TSH, hemoglobin A1c, and HIV checked-negative.  If the EMG suggests sensory ganglionopathy, her workup will be further expanded.    I would like to review her brain and  cervical spine MRI images in person.  We will call [facility] and ask them to push the images into PACS.  White matter lesions are very common in brain MRIs and they are especially common in people with chronic migraine or hypertension.  This is 10 times more common than demyelinating disease, and if the shape, size, and location of the lesions in our opinion does not raise high concerns about MS, I would not recommend investigating it further.    I would like the patient to see one of our migraine/headache specialists, and I will place a referral today.    She will return to the neuromuscular clinic for follow-up in 4 months, sooner if needed.    Sincerely,    Hsusain Young MD, FAAN    Total time spent on this encounter today 55 minutes of which 35 face-to-face, 10 in postvisit note dictation, editing, and orders, and 10 in previsit chart review.    The longitudinal plan of care for the diagnosis(es)/condition(s) as documented were addressed during this visit. Due to the added complexity in care, I will continue to support Judi in the subsequent management and with ongoing continuity of care.

## 2024-07-10 NOTE — NURSING NOTE
Judi Leavitt is a 64 year old female who presents for:  Chief Complaint   Patient presents with    NEUROPATHY        Initial Vitals:  Ht 1.829 m (6')   Wt 62.1 kg (137 lb)   BMI 18.58 kg/m   Estimated body mass index is 18.58 kg/m  as calculated from the following:    Height as of this encounter: 1.829 m (6').    Weight as of this encounter: 62.1 kg (137 lb).. Body surface area is 1.78 meters squared. BP completed using cuff size: yoel Sheth CMA

## 2024-08-20 ENCOUNTER — OFFICE VISIT (OUTPATIENT)
Dept: NEUROLOGY | Facility: CLINIC | Age: 64
End: 2024-08-20
Payer: COMMERCIAL

## 2024-08-20 DIAGNOSIS — R20.0 NUMBNESS ON LEFT SIDE: Primary | ICD-10-CM

## 2024-08-20 DIAGNOSIS — G62.9 NEUROPATHY: ICD-10-CM

## 2024-08-20 DIAGNOSIS — D47.2 IGM MONOCLONAL GAMMOPATHY OF UNCERTAIN SIGNIFICANCE: ICD-10-CM

## 2024-08-20 PROCEDURE — 95913 NRV CNDJ TEST 13/> STUDIES: CPT | Performed by: PSYCHIATRY & NEUROLOGY

## 2024-08-20 NOTE — LETTER
2024      Judi Leavitt  [address]  [place] [address]      Dear Colleague,    Thank you for referring your patient, Judi Leavitt, to the [facility] NEUROLOGY CLINICS [facility]. Please see a copy of my visit note below.                        [facility]  Electrodiagnostic Laboratory                 Department of Neurology                                                                                                         Test Date:  2024    Patient: Judi Leavitt : 1960 Physician: Hussain Young MD   Sex: Female AGE: 64 year Ref Phys: Hussain Young MD   ID#: [id number]   Technician:      History and Examination:    64-year-old woman with chief complaint of dysesthesias and numbness affecting her left side, including face, arm, and leg, for a few years now.  She does have milder similar symptoms on the right side.  Neurological exam shows absent ankle jerks bilaterally and mildly reduced vibration at the toes.  EMG was requested to evaluate for polyneuropathy, and specifically to look for asymmetries, and to differentiate a length-dependent sensory or sensorimotor polyneuropathy from sensory ganglionopathy.    Techniques:    Motor and sensory nerve conduction studies were done with surface recording electrodes. Temperature was monitored and recorded throughout the study. Upper extremities were maintained at a temperature of 32 degrees Centigrade or higher.      Results:    Bilateral fibular (EDB), bilateral tibial (AH), left median (APB), and left ulnar (ADM) motor nerve conduction studies were normal.  Bilateral median, ulnar, radial, superficial fibular, and sural antidromic sensory nerve conduction studies were all normal, without significant side-to-side differences.  Needle EMG was deferred as the principal question was about sensory neuropathy, and there was no suspicion of myopathy or coexistent radiculopathy which would require a needle  EMG examination to demonstrate    Interpretation:    Essentially normal study.  No electrodiagnostic evidence of large fiber sensorimotor polyneuropathy.     ___________________________  Hussain Young MD        Nerve Conduction Studies  Motor Sites      Latency Neg. Amp Neg. Amp Diff Segment Distance Velocity Neg. Dur Neg Area Diff Temperature Comment   Site (ms) Norm (mV) Norm (%)  cm m/s Norm (ms) (%) ( C)    Left Fibular (EDB) Motor   Ankle 3.8  < 6.0 5.9 -  Ankle-EDB 8   5.2  22.6    Bel Fibular Head 10.1 - 4.6 - -22 Bel Fibular Head-Ankle 30 48  > 38 5.7 -8 22.5    Pop Fossa 11.5 - 4.6 - 0 Pop Fossa-Bel Fibular Head 8 57  > 38 5.6 -5 22.6    Right Fibular (EDB) Motor   Ankle 4.2  < 6.0 6.6 -  Ankle-EDB 8   5.0  23.2    Left Median (APB) Motor   Wrist 3.7  < 4.4 7.8  > 5.0  Wrist-APB 8   4.8  23.9    Elbow 7.1 - 7.8  > 5.0 0 Elbow-Wrist 20 59  > 48 4.8 1 23.9    Left Tibial (AHB) Motor   Ankle 4.0  < 6.5 15.3  > 5.0  Ankle-AH 8   5.4  22.7    Knee 11.6 - 11.4 - -25 Knee-Ankle 35 46  > 38 6.3 -13 22.8    Right Tibial (AHB) Motor   Ankle 3.2  < 6.5 14.8  > 5.0  Ankle-AH 8   5.4  23.4    Left Ulnar (ADM) Motor   Wrist 2.5  < 3.5 6.9  > 5.0  Wrist-ADM 8   4.6  24    Below Elbow 5.2 - 6.5 - -6 Below Elbow-Wrist 17 63  > 48 4.7 -2 24    Above Elbow 6.6 - 6.4 - -2 Above Elbow-Below Elbow 8 57  > 48 4.9 -2 24      Sensory Sites      Onset Lat Peak Lat Amp (O-P) Amp (P-P) Segment Distance Velocity Temperature Comment   Site ms (ms)  V Norm ( V)  cm m/s Norm ( C)    Left Median Sensory   Wrist-Dig II 2.4 3.1 37  > 10 56 Wrist-Dig II 14 58  > 48 24.4    Right Median Sensory   Wrist-Dig II 2.5 3.2 29  > 10 40 Wrist-Dig II 14 56  > 48 24.9    Left Radial Sensory   Forearm 1.23 1.80 34  > 15 51 Forearm-Wrist 10 81 - 24.2    Right Radial Sensory   Forearm-Wrist 1.40 1.90 35  > 15 54 Forearm-Wrist 10 71 - 25    Left Superficial Fibular Sensory   Lower Leg-Ankle 2.8 3.8 7  > 3 9 Lower Leg-Ankle 12.5 45 - 22.9    Right  Superficial Fibular Sensory   Lower Leg-Ankle 2.8 3.5 8  > 3 6 Lower Leg-Ankle 12.5 45 - 23.8    Left Sural Sensory   Calf-Lat Malleolus 3.2 4.1 7  > 5 - Calf-Lat Malleolus 14 44  > 38 23.1    Right Sural Sensory   Calf-Lat Malleolus 3.1 4.0 10  > 5 - Calf-Lat Malleolus 14 45  > 38 23.6    Left Ulnar Sensory   Wrist-Dig V 2.6 3.3 28  > 8 62 Wrist-Dig V 12.5 48  > 48 24.5    Right Ulnar Sensory   Wrist-Dig V 2.8 3.4 17  > 8 33 Wrist-Dig V 12.5 45  > 48 24.9            NCS Waveforms:    Motor                    Sensory                                    Ultrasound Images:              Again, thank you for allowing me to participate in the care of your patient.        Sincerely,        Hussain Young MD

## 2024-08-20 NOTE — PROGRESS NOTES
[facility]  Electrodiagnostic Laboratory                 Department of Neurology                                                                                                         Test Date:  2024    Patient: Judi Leavitt : 1960 Physician: Hussain Young MD   Sex: Female AGE: 64 year Ref Phys: Hussain Young MD   ID#: [id number]   Technician:      History and Examination:    64-year-old woman with chief complaint of dysesthesias and numbness affecting her left side, including face, arm, and leg, for a few years now.  She does have milder similar symptoms on the right side.  Neurological exam shows absent ankle jerks bilaterally and mildly reduced vibration at the toes.  EMG was requested to evaluate for polyneuropathy, and specifically to look for asymmetries, and to differentiate a length-dependent sensory or sensorimotor polyneuropathy from sensory ganglionopathy.    Techniques:    Motor and sensory nerve conduction studies were done with surface recording electrodes. Temperature was monitored and recorded throughout the study. Upper extremities were maintained at a temperature of 32 degrees Centigrade or higher.      Results:    Bilateral fibular (EDB), bilateral tibial (AH), left median (APB), and left ulnar (ADM) motor nerve conduction studies were normal.  Bilateral median, ulnar, radial, superficial fibular, and sural antidromic sensory nerve conduction studies were all normal, without significant side-to-side differences.  Needle EMG was deferred as the principal question was about sensory neuropathy, and there was no suspicion of myopathy or coexistent radiculopathy which would require a needle EMG examination to demonstrate    Interpretation:    Essentially normal study.  No electrodiagnostic evidence of large fiber sensorimotor polyneuropathy.     ___________________________  Hussain Young MD        Nerve Conduction Studies  Motor  Sites      Latency Neg. Amp Neg. Amp Diff Segment Distance Velocity Neg. Dur Neg Area Diff Temperature Comment   Site (ms) Norm (mV) Norm (%)  cm m/s Norm (ms) (%) ( C)    Left Fibular (EDB) Motor   Ankle 3.8  < 6.0 5.9 -  Ankle-EDB 8   5.2  22.6    Bel Fibular Head 10.1 - 4.6 - -22 Bel Fibular Head-Ankle 30 48  > 38 5.7 -8 22.5    Pop Fossa 11.5 - 4.6 - 0 Pop Fossa-Bel Fibular Head 8 57  > 38 5.6 -5 22.6    Right Fibular (EDB) Motor   Ankle 4.2  < 6.0 6.6 -  Ankle-EDB 8   5.0  23.2    Left Median (APB) Motor   Wrist 3.7  < 4.4 7.8  > 5.0  Wrist-APB 8   4.8  23.9    Elbow 7.1 - 7.8  > 5.0 0 Elbow-Wrist 20 59  > 48 4.8 1 23.9    Left Tibial (AHB) Motor   Ankle 4.0  < 6.5 15.3  > 5.0  Ankle-AH 8   5.4  22.7    Knee 11.6 - 11.4 - -25 Knee-Ankle 35 46  > 38 6.3 -13 22.8    Right Tibial (AHB) Motor   Ankle 3.2  < 6.5 14.8  > 5.0  Ankle-AH 8   5.4  23.4    Left Ulnar (ADM) Motor   Wrist 2.5  < 3.5 6.9  > 5.0  Wrist-ADM 8   4.6  24    Below Elbow 5.2 - 6.5 - -6 Below Elbow-Wrist 17 63  > 48 4.7 -2 24    Above Elbow 6.6 - 6.4 - -2 Above Elbow-Below Elbow 8 57  > 48 4.9 -2 24      Sensory Sites      Onset Lat Peak Lat Amp (O-P) Amp (P-P) Segment Distance Velocity Temperature Comment   Site ms (ms)  V Norm ( V)  cm m/s Norm ( C)    Left Median Sensory   Wrist-Dig II 2.4 3.1 37  > 10 56 Wrist-Dig II 14 58  > 48 24.4    Right Median Sensory   Wrist-Dig II 2.5 3.2 29  > 10 40 Wrist-Dig II 14 56  > 48 24.9    Left Radial Sensory   Forearm 1.23 1.80 34  > 15 51 Forearm-Wrist 10 81 - 24.2    Right Radial Sensory   Forearm-Wrist 1.40 1.90 35  > 15 54 Forearm-Wrist 10 71 - 25    Left Superficial Fibular Sensory   Lower Leg-Ankle 2.8 3.8 7  > 3 9 Lower Leg-Ankle 12.5 45 - 22.9    Right Superficial Fibular Sensory   Lower Leg-Ankle 2.8 3.5 8  > 3 6 Lower Leg-Ankle 12.5 45 - 23.8    Left Sural Sensory   Calf-Lat Malleolus 3.2 4.1 7  > 5 - Calf-Lat Malleolus 14 44  > 38 23.1    Right Sural Sensory   Calf-Lat Malleolus 3.1 4.0 10  > 5 -  Calf-Lat Malleolus 14 45  > 38 23.6    Left Ulnar Sensory   Wrist-Dig V 2.6 3.3 28  > 8 62 Wrist-Dig V 12.5 48  > 48 24.5    Right Ulnar Sensory   Wrist-Dig V 2.8 3.4 17  > 8 33 Wrist-Dig V 12.5 45  > 48 24.9            NCS Waveforms:    Motor                    Sensory                                    Ultrasound Images:

## 2024-08-30 ENCOUNTER — LAB (OUTPATIENT)
Dept: LAB | Facility: CLINIC | Age: 64
End: 2024-08-30
Payer: COMMERCIAL

## 2024-08-30 DIAGNOSIS — G62.9 NEUROPATHY: ICD-10-CM

## 2024-08-30 LAB
CRP SERPL-MCNC: <3 MG/L
RHEUMATOID FACT SERPL-ACNC: <10 IU/ML

## 2024-08-30 PROCEDURE — 86140 C-REACTIVE PROTEIN: CPT

## 2024-08-30 PROCEDURE — 86038 ANTINUCLEAR ANTIBODIES: CPT

## 2024-08-30 PROCEDURE — 86039 ANTINUCLEAR ANTIBODIES (ANA): CPT

## 2024-08-30 PROCEDURE — 86431 RHEUMATOID FACTOR QUANT: CPT

## 2024-08-30 PROCEDURE — 36415 COLL VENOUS BLD VENIPUNCTURE: CPT

## 2024-08-30 PROCEDURE — 86235 NUCLEAR ANTIGEN ANTIBODY: CPT

## 2024-08-30 PROCEDURE — 86364 TISS TRNSGLTMNASE EA IG CLAS: CPT

## 2024-08-30 PROCEDURE — 86334 IMMUNOFIX E-PHORESIS SERUM: CPT | Performed by: PATHOLOGY

## 2024-09-03 LAB
ANA PAT SER IF-IMP: ABNORMAL
ANA SER QL IF: POSITIVE
ANA TITR SER IF: ABNORMAL {TITER}

## 2024-09-04 LAB
ENA SM IGG SER IA-ACNC: <0.7 U/ML
ENA SM IGG SER IA-ACNC: NEGATIVE
ENA SS-A AB SER IA-ACNC: <0.5 U/ML
ENA SS-A AB SER IA-ACNC: NEGATIVE
ENA SS-B IGG SER IA-ACNC: <0.6 U/ML
ENA SS-B IGG SER IA-ACNC: NEGATIVE
TTG IGA SER-ACNC: <0.2 U/ML
TTG IGG SER-ACNC: <0.6 U/ML
U1 SNRNP IGG SER IA-ACNC: <1.1 U/ML
U1 SNRNP IGG SER IA-ACNC: NEGATIVE

## 2024-09-06 LAB — PROT PATTERN SERPL IFE-IMP: NORMAL

## 2024-09-10 ENCOUNTER — PATIENT OUTREACH (OUTPATIENT)
Dept: ONCOLOGY | Facility: CLINIC | Age: 64
End: 2024-09-10
Payer: COMMERCIAL

## 2024-09-10 NOTE — PROGRESS NOTES
New Patient Oncology Nurse Navigator Note     Referral Received: 09/10/24      Referring provider:     Hussain Young MD     Referring Clinic/Organization: 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   Pathologic significance requires clinical correlation. SEGUNDO Quan M.D., Ph.D., Pathologist   Resulting Agency [facility]              Specimen Collected: 08/30/24  1:22          Records Location: [facility]     Records Needed:     N/A    Additional testing needed prior to consult:     Labs    Referral updates and Plan:     09/10/2024 12:16 PM -  Referral received and reviewed. Message sent to referring provider requesting additional labs be drawn prior to scheduling.    09/11/2024 11:12 AM -  Referring provider placed additional labs.  Called pt at this time to introduce myself.  Pt states she isn't ready to see the hematologist because she needs to see the rheumatologist and can only do one thing at a time. Advised that lab orders have been placed and she can get them drawn at her convenience. Advised I would watch for results and reach back out to see if she is ready to schedule. Pt agrees with plan.     09/12/2024 11:04 AM -  Results back from labs.  Possible MGUS.  Will contact pt next week as requested. Slot held with BEBE Chavira on 11/20 in [place].    09/17/2024 12:27 PM -  Spoke with pt at this time who is ready to schedule with oncology. Transferred to NPS to schedule in held time slot.    Mena Caldera, SUZANNEN, RN  Hematology/Oncology Nurse Navigator  [facility]  [phone] / [phone]

## 2024-09-11 ENCOUNTER — LAB (OUTPATIENT)
Dept: LAB | Facility: CLINIC | Age: 64
End: 2024-09-11
Attending: PSYCHIATRY & NEUROLOGY
Payer: COMMERCIAL

## 2024-09-11 DIAGNOSIS — D47.2 IGM MONOCLONAL GAMMOPATHY OF UNCERTAIN SIGNIFICANCE: ICD-10-CM

## 2024-09-11 LAB
ALBUMIN SERPL BCG-MCNC: 3.9 G/DL (ref 3.5–5.2)
ALP SERPL-CCNC: 58 U/L (ref 40–150)
ALT SERPL W P-5'-P-CCNC: 16 U/L (ref 0–50)
ANION GAP SERPL CALCULATED.3IONS-SCNC: 9 MMOL/L (ref 7–15)
AST SERPL W P-5'-P-CCNC: 37 U/L (ref 0–45)
BASOPHILS # BLD AUTO: 0.1 10E3/UL (ref 0–0.2)
BASOPHILS NFR BLD AUTO: 1 %
BILIRUB SERPL-MCNC: 0.3 MG/DL
BUN SERPL-MCNC: 11.6 MG/DL (ref 8–23)
CALCIUM SERPL-MCNC: 8.9 MG/DL (ref 8.8–10.4)
CHLORIDE SERPL-SCNC: 108 MMOL/L (ref 98–107)
CREAT SERPL-MCNC: 0.94 MG/DL (ref 0.51–0.95)
EGFRCR SERPLBLD CKD-EPI 2021: 67 ML/MIN/1.73M2
EOSINOPHIL # BLD AUTO: 0.2 10E3/UL (ref 0–0.7)
EOSINOPHIL NFR BLD AUTO: 5 %
ERYTHROCYTE [DISTWIDTH] IN BLOOD BY AUTOMATED COUNT: 13.6 % (ref 10–15)
GLUCOSE SERPL-MCNC: 82 MG/DL (ref 70–99)
HCO3 SERPL-SCNC: 20 MMOL/L (ref 22–29)
HCT VFR BLD AUTO: 42.7 % (ref 35–47)
HGB BLD-MCNC: 13.5 G/DL (ref 11.7–15.7)
IMM GRANULOCYTES # BLD: 0 10E3/UL
IMM GRANULOCYTES NFR BLD: 1 %
LYMPHOCYTES # BLD AUTO: 1.5 10E3/UL (ref 0.8–5.3)
LYMPHOCYTES NFR BLD AUTO: 31 %
MCH RBC QN AUTO: 31.9 PG (ref 26.5–33)
MCHC RBC AUTO-ENTMCNC: 31.6 G/DL (ref 31.5–36.5)
MCV RBC AUTO: 101 FL (ref 78–100)
MONOCYTES # BLD AUTO: 0.4 10E3/UL (ref 0–1.3)
MONOCYTES NFR BLD AUTO: 9 %
NEUTROPHILS # BLD AUTO: 2.6 10E3/UL (ref 1.6–8.3)
NEUTROPHILS NFR BLD AUTO: 54 %
NRBC # BLD AUTO: 0 10E3/UL
NRBC BLD AUTO-RTO: 0 /100
PLAT MORPH BLD: ABNORMAL
PLATELET # BLD AUTO: ABNORMAL 10*3/UL
POTASSIUM SERPL-SCNC: 4.2 MMOL/L (ref 3.4–5.3)
PROT SERPL-MCNC: 6.6 G/DL (ref 6.4–8.3)
RBC # BLD AUTO: 4.23 10E6/UL (ref 3.8–5.2)
RBC MORPH BLD: ABNORMAL
SODIUM SERPL-SCNC: 137 MMOL/L (ref 135–145)
WBC # BLD AUTO: 4.8 10E3/UL (ref 4–11)

## 2024-09-11 PROCEDURE — 85018 HEMOGLOBIN: CPT

## 2024-09-11 PROCEDURE — 85048 AUTOMATED LEUKOCYTE COUNT: CPT

## 2024-09-11 PROCEDURE — 85014 HEMATOCRIT: CPT

## 2024-09-11 PROCEDURE — 85004 AUTOMATED DIFF WBC COUNT: CPT

## 2024-09-11 PROCEDURE — 80053 COMPREHEN METABOLIC PANEL: CPT

## 2024-09-11 PROCEDURE — 82784 ASSAY IGA/IGD/IGG/IGM EACH: CPT

## 2024-09-11 PROCEDURE — 83521 IG LIGHT CHAINS FREE EACH: CPT

## 2024-09-11 PROCEDURE — 85041 AUTOMATED RBC COUNT: CPT

## 2024-09-12 LAB
IGA SERPL-MCNC: 133 MG/DL (ref 84–499)
IGG SERPL-MCNC: 776 MG/DL (ref 610–1616)
IGM SERPL-MCNC: 408 MG/DL (ref 35–242)
KAPPA LC FREE SER-MCNC: 2.24 MG/DL (ref 0.33–1.94)
KAPPA LC FREE/LAMBDA FREE SER NEPH: 1.46 {RATIO} (ref 0.26–1.65)
LAMBDA LC FREE SERPL-MCNC: 1.53 MG/DL (ref 0.57–2.63)

## 2024-10-15 ENCOUNTER — TRANSFERRED RECORDS (OUTPATIENT)
Dept: HEALTH INFORMATION MANAGEMENT | Facility: CLINIC | Age: 64
End: 2024-10-15
Payer: COMMERCIAL

## 2024-10-15 LAB
ALT SERPL-CCNC: 21 IU/L (ref 6–29)
AST SERPL-CCNC: 23 U/L (ref 10–35)
CREATININE (EXTERNAL): 0.94 MG/DL (ref 0.5–1.05)
GLUCOSE (EXTERNAL): 96 MG/DL (ref 65–99)
POTASSIUM (EXTERNAL): 4.1 MMOL/L (ref 3.5–5.3)

## 2025-01-21 ENCOUNTER — TELEPHONE (OUTPATIENT)
Dept: FAMILY MEDICINE | Facility: CLINIC | Age: 65
End: 2025-01-21

## 2025-01-21 NOTE — TELEPHONE ENCOUNTER
Patient Quality Outreach    Patient is due for the following:   Hypertension -  Hypertension follow-up visit  Colon Cancer Screening  Breast Cancer Screening - Mammogram  Physical Annual Wellness Visit    Action(s) Taken:   Schedule a Annual Wellness Visit    Type of outreach:    Sent [employer] message.    Questions for provider review:    None           Sayra Flores, [facility]

## 2025-01-29 ENCOUNTER — OFFICE VISIT (OUTPATIENT)
Dept: FAMILY MEDICINE | Facility: CLINIC | Age: 65
End: 2025-01-29
Payer: COMMERCIAL

## 2025-01-29 VITALS
HEART RATE: 61 BPM | WEIGHT: 140.1 LBS | DIASTOLIC BLOOD PRESSURE: 88 MMHG | TEMPERATURE: 97.7 F | OXYGEN SATURATION: 97 % | SYSTOLIC BLOOD PRESSURE: 138 MMHG | BODY MASS INDEX: 19 KG/M2 | RESPIRATION RATE: 20 BRPM

## 2025-01-29 DIAGNOSIS — J32.4 CHRONIC PANSINUSITIS: Primary | ICD-10-CM

## 2025-01-29 PROCEDURE — 99213 OFFICE O/P EST LOW 20 MIN: CPT | Performed by: PHYSICIAN ASSISTANT

## 2025-01-29 RX ORDER — AZELASTINE 1 MG/ML
1-2 SPRAY, METERED NASAL 2 TIMES DAILY
Qty: 30 ML | Refills: 0 | Status: SHIPPED | OUTPATIENT
Start: 2025-01-29

## 2025-01-29 ASSESSMENT — PAIN SCALES - GENERAL: PAINLEVEL_OUTOF10: NO PAIN (0)

## 2025-01-29 NOTE — PROGRESS NOTES
Assessment & Plan       ICD-10-CM    1. Chronic pansinusitis  J32.4 azelastine (ASTELIN) 0.1 % nasal spray        No evidence of infection; no need for antibiotics at this time. Evidence of lingering sinus inflammation. 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 Recommend Astelin nasal spray BID to help with linger inflammation and congestion. Apply warm compress multiple times per day to help with sinus pain and pressure. Discussed symptoms that warrant recheck.    Subjective   Judi is a 65 year old, presenting for the following health issues:  URI    History of Present Illness       Reason for visit:  Virus  Symptom onset:  3-4 weeks ago  Symptoms include:  Sick  Symptom intensity:  Moderate  Symptom progression:  Improving  Had these symptoms before:  Yes  Has tried/received treatment for these symptoms:  No  What makes it worse:  No  What makes it better:  No   She is taking medications regularly.     Patient developed diarrhea and stomach upset for several days shortly after [facility], which resolved. She then developed URI 1/9/2025. Endorses f/c/s, rhinorrhea, congestion, ST, cough, body aches, fatigue. She was off work for several days due to body aches and fatigue. She notes that while symptoms are improving, she continues to have ongoing sinus pressure and headaches, ear pressure, ST.      Review of Systems  Constitutional, HEENT, cardiovascular, pulmonary, GI, , musculoskeletal, neuro, skin, endocrine and psych systems are negative, except as otherwise noted.      Objective    /88   Pulse 61   Temp 97.7  F (36.5  C)   Resp 20   Wt 63.5 kg (140 lb 1.6 oz)   SpO2 97%   BMI 19.00 kg/m    Body mass index is 19 kg/m .  Physical Exam   GENERAL:  WDWN, no acute distress  EYES: no discharge, no injection  HENT:  Normocephalic. Moist mucus membranes. Ear canals clear. TMs dull gray w/o effusion. Oropharynx pink, uvula midline. Sinuses non-TTP. Nasal mucosa pink, non-edematous,  no rhinorrhea.  NECK:  Supple, symmetric, no TERRELL  LUNGS:  Clear to auscultation bilaterally without rhonchi, rales, or wheeze. Chest rise symmetric and no tenderness to palpation.  HEART:  Regular rate & rhythm. No murmur, gallop, or rub.  EXTREMITIES:  No gross deformities, moves all 4 limbs spontaneously, no peripheral edema  SKIN:  Warm and dry, no rash or suspicious lesions    NEUROLOGIC:  Alert, sensation grossly intact.            Signed Electronically by: Idalia Felix PA-C

## 2025-02-10 ENCOUNTER — OFFICE VISIT (OUTPATIENT)
Dept: FAMILY MEDICINE | Facility: CLINIC | Age: 65
End: 2025-02-10
Payer: COMMERCIAL

## 2025-02-10 VITALS
SYSTOLIC BLOOD PRESSURE: 136 MMHG | OXYGEN SATURATION: 100 % | TEMPERATURE: 97.3 F | WEIGHT: 139.6 LBS | HEIGHT: 62 IN | BODY MASS INDEX: 25.69 KG/M2 | HEART RATE: 76 BPM | RESPIRATION RATE: 11 BRPM | DIASTOLIC BLOOD PRESSURE: 84 MMHG

## 2025-02-10 DIAGNOSIS — E55.9 VITAMIN D DEFICIENCY: Primary | ICD-10-CM

## 2025-02-10 DIAGNOSIS — Z12.11 SCREEN FOR COLON CANCER: ICD-10-CM

## 2025-02-10 DIAGNOSIS — Z12.31 VISIT FOR SCREENING MAMMOGRAM: ICD-10-CM

## 2025-02-10 DIAGNOSIS — J01.90 ACUTE SINUSITIS WITH SYMPTOMS > 10 DAYS: ICD-10-CM

## 2025-02-10 DIAGNOSIS — J01.90 ACUTE SINUSITIS WITH SYMPTOMS > 10 DAYS: Primary | ICD-10-CM

## 2025-02-10 PROCEDURE — 99213 OFFICE O/P EST LOW 20 MIN: CPT | Performed by: FAMILY MEDICINE

## 2025-02-10 PROCEDURE — 3075F SYST BP GE 130 - 139MM HG: CPT | Performed by: FAMILY MEDICINE

## 2025-02-10 PROCEDURE — 3079F DIAST BP 80-89 MM HG: CPT | Performed by: FAMILY MEDICINE

## 2025-02-10 PROCEDURE — 1125F AMNT PAIN NOTED PAIN PRSNT: CPT | Performed by: FAMILY MEDICINE

## 2025-02-10 RX ORDER — AZITHROMYCIN 250 MG/1
TABLET, FILM COATED ORAL
Qty: 6 TABLET | Refills: 0 | Status: SHIPPED | OUTPATIENT
Start: 2025-02-10

## 2025-02-10 ASSESSMENT — PAIN SCALES - GENERAL: PAINLEVEL_OUTOF10: MODERATE PAIN (5)

## 2025-02-10 ASSESSMENT — ENCOUNTER SYMPTOMS: SORE THROAT: 1

## 2025-02-10 NOTE — PROGRESS NOTES
"  {PROVIDER CHARTING PREFERENCE:723362}    Subjective   Judi is a 65 year old, presenting for the following health issues:  Pharyngitis (X5 weeks- Sore throat, productive, no fever, and experiencing headaches, and sinus pressure around the nose and eyes, and ear pain. )        2/10/2025    12:48 PM   Additional Questions   Roomed by Yumi MUELLER     Pharyngitis     History of Present Illness       Reason for visit:  Virus  Symptom onset:  3-4 weeks ago  Symptoms include:  Sick  Symptom intensity:  Moderate  Symptom progression:  Improving  Had these symptoms before:  Yes  Has tried/received treatment for these symptoms:  No  What makes it worse:  No  What makes it better:  No   She is taking medications regularly.       {MA/LPN/RN Pre-Provider Visit Orders- hCG/UA/Strep (Optional):997507}  {SUPERLIST (Optional):224740}  {additonal problems for provider to add (Optional):824638}    {ROS Picklists (Optional):610887}      Objective    /84   Pulse 76   Temp 97.3  F (36.3  C) (Tympanic)   Resp 11   Ht 1.575 m (5' 2\")   Wt 63.3 kg (139 lb 9.6 oz)   SpO2 100%   BMI 25.53 kg/m    Body mass index is 25.53 kg/m .  Physical Exam   {Exam List (Optional):961527}    {Diagnostic Test Results (Optional):467226}        Signed Electronically by: Nina Gomez MD  {Email feedback regarding this note to [email or web address]   :892268}  " sinus tenderness noted on exam.  Nose and mouth without ulcers or lesions  NECK: no adenopathy, no asymmetry, masses, or scars  RESP: lungs clear to auscultation - no rales, rhonchi or wheezes  CV: regular rate and rhythm, normal S1 S2, no S3 or S4, no murmur, click or rub, no peripheral edema            Signed Electronically by: Nina Gomez MD

## 2025-05-24 DIAGNOSIS — E03.9 HYPOTHYROIDISM, UNSPECIFIED TYPE: ICD-10-CM

## 2025-05-27 RX ORDER — LEVOTHYROXINE SODIUM 75 MCG
75 TABLET ORAL DAILY
Qty: 90 TABLET | Refills: 0 | Status: SHIPPED | OUTPATIENT
Start: 2025-05-27

## 2025-05-27 NOTE — TELEPHONE ENCOUNTER
Patient is overdue for her recheck on her thyroid as well as annual exam.  Will assist with scheduling.  Refill order    Nina Gomez MD  [facility], [place]

## 2025-05-28 NOTE — TELEPHONE ENCOUNTER
1st call attempt, LVM, deferring for 1 business days for 2nd call attempt    Phillip Guzmán CMA on 5/28/2025 at 8:20 AM

## 2025-05-29 NOTE — TELEPHONE ENCOUNTER
2nd  call attempt, LVM, deferring for 1 business days for 3rd call attempt  Phillip Guzmán CMA on 5/29/2025 at 1:22 PM

## 2025-06-08 ENCOUNTER — HEALTH MAINTENANCE LETTER (OUTPATIENT)
Age: 65
End: 2025-06-08

## 2025-06-24 DIAGNOSIS — I10 PRIMARY HYPERTENSION: ICD-10-CM

## 2025-06-25 RX ORDER — CARVEDILOL 25 MG/1
25 TABLET ORAL 2 TIMES DAILY
Qty: 180 TABLET | Refills: 1 | Status: SHIPPED | OUTPATIENT
Start: 2025-06-25

## 2025-07-14 ENCOUNTER — RESULTS FOLLOW-UP (OUTPATIENT)
Dept: FAMILY MEDICINE | Facility: CLINIC | Age: 65
End: 2025-07-14

## 2025-07-14 ENCOUNTER — OFFICE VISIT (OUTPATIENT)
Dept: FAMILY MEDICINE | Facility: CLINIC | Age: 65
End: 2025-07-14
Payer: COMMERCIAL

## 2025-07-14 VITALS
BODY MASS INDEX: 25.27 KG/M2 | SYSTOLIC BLOOD PRESSURE: 129 MMHG | HEIGHT: 63 IN | OXYGEN SATURATION: 97 % | TEMPERATURE: 98.7 F | RESPIRATION RATE: 12 BRPM | HEART RATE: 54 BPM | WEIGHT: 142.6 LBS | DIASTOLIC BLOOD PRESSURE: 74 MMHG

## 2025-07-14 DIAGNOSIS — R22.2 SUBCUTANEOUS MASS OF BACK: ICD-10-CM

## 2025-07-14 DIAGNOSIS — Z12.11 SCREEN FOR COLON CANCER: ICD-10-CM

## 2025-07-14 DIAGNOSIS — E78.00 HYPERCHOLESTEREMIA: ICD-10-CM

## 2025-07-14 DIAGNOSIS — D47.2 IGM MONOCLONAL GAMMOPATHY OF UNCERTAIN SIGNIFICANCE: ICD-10-CM

## 2025-07-14 DIAGNOSIS — Z00.00 ROUTINE GENERAL MEDICAL EXAMINATION AT A HEALTH CARE FACILITY: Primary | ICD-10-CM

## 2025-07-14 DIAGNOSIS — I10 PRIMARY HYPERTENSION: ICD-10-CM

## 2025-07-14 DIAGNOSIS — R79.89 ELEVATED SERUM CREATININE: Primary | ICD-10-CM

## 2025-07-14 DIAGNOSIS — E03.9 HYPOTHYROIDISM, UNSPECIFIED TYPE: ICD-10-CM

## 2025-07-14 DIAGNOSIS — Z12.31 VISIT FOR SCREENING MAMMOGRAM: ICD-10-CM

## 2025-07-14 LAB
ALBUMIN SERPL BCG-MCNC: 4 G/DL (ref 3.5–5.2)
ALP SERPL-CCNC: 67 U/L (ref 40–150)
ALT SERPL W P-5'-P-CCNC: 17 U/L (ref 0–50)
ANION GAP SERPL CALCULATED.3IONS-SCNC: 9 MMOL/L (ref 7–15)
AST SERPL W P-5'-P-CCNC: 26 U/L (ref 0–45)
BILIRUB SERPL-MCNC: 0.4 MG/DL
BUN SERPL-MCNC: 21.7 MG/DL (ref 8–23)
CALCIUM SERPL-MCNC: 9.3 MG/DL (ref 8.8–10.4)
CHLORIDE SERPL-SCNC: 108 MMOL/L (ref 98–107)
CHOLEST SERPL-MCNC: 230 MG/DL
CREAT SERPL-MCNC: 1.07 MG/DL (ref 0.51–0.95)
EGFRCR SERPLBLD CKD-EPI 2021: 57 ML/MIN/1.73M2
ERYTHROCYTE [DISTWIDTH] IN BLOOD BY AUTOMATED COUNT: 13.3 % (ref 10–15)
FASTING STATUS PATIENT QL REPORTED: YES
FASTING STATUS PATIENT QL REPORTED: YES
GLUCOSE SERPL-MCNC: 99 MG/DL (ref 70–99)
HCO3 SERPL-SCNC: 23 MMOL/L (ref 22–29)
HCT VFR BLD AUTO: 42 % (ref 35–47)
HDLC SERPL-MCNC: 47 MG/DL
HGB BLD-MCNC: 14.3 G/DL (ref 11.7–15.7)
LDLC SERPL CALC-MCNC: 151 MG/DL
MCH RBC QN AUTO: 31.6 PG (ref 26.5–33)
MCHC RBC AUTO-ENTMCNC: 34 G/DL (ref 31.5–36.5)
MCV RBC AUTO: 93 FL (ref 78–100)
NONHDLC SERPL-MCNC: 183 MG/DL
PLATELET # BLD AUTO: 157 10E3/UL (ref 150–450)
POTASSIUM SERPL-SCNC: 3.8 MMOL/L (ref 3.4–5.3)
PROT SERPL-MCNC: 6.7 G/DL (ref 6.4–8.3)
RBC # BLD AUTO: 4.52 10E6/UL (ref 3.8–5.2)
SODIUM SERPL-SCNC: 140 MMOL/L (ref 135–145)
TRIGL SERPL-MCNC: 158 MG/DL
TSH SERPL DL<=0.005 MIU/L-ACNC: 1.22 UIU/ML (ref 0.3–4.2)
WBC # BLD AUTO: 5.1 10E3/UL (ref 4–11)

## 2025-07-14 PROCEDURE — 3074F SYST BP LT 130 MM HG: CPT | Performed by: PHYSICIAN ASSISTANT

## 2025-07-14 PROCEDURE — G2211 COMPLEX E/M VISIT ADD ON: HCPCS | Performed by: PHYSICIAN ASSISTANT

## 2025-07-14 PROCEDURE — 3078F DIAST BP <80 MM HG: CPT | Performed by: PHYSICIAN ASSISTANT

## 2025-07-14 PROCEDURE — 84443 ASSAY THYROID STIM HORMONE: CPT | Performed by: PHYSICIAN ASSISTANT

## 2025-07-14 PROCEDURE — 80061 LIPID PANEL: CPT | Performed by: PHYSICIAN ASSISTANT

## 2025-07-14 PROCEDURE — 99397 PER PM REEVAL EST PAT 65+ YR: CPT | Performed by: PHYSICIAN ASSISTANT

## 2025-07-14 PROCEDURE — 1126F AMNT PAIN NOTED NONE PRSNT: CPT | Performed by: PHYSICIAN ASSISTANT

## 2025-07-14 PROCEDURE — 85027 COMPLETE CBC AUTOMATED: CPT | Performed by: PHYSICIAN ASSISTANT

## 2025-07-14 PROCEDURE — 80053 COMPREHEN METABOLIC PANEL: CPT | Performed by: PHYSICIAN ASSISTANT

## 2025-07-14 PROCEDURE — 36415 COLL VENOUS BLD VENIPUNCTURE: CPT | Performed by: PHYSICIAN ASSISTANT

## 2025-07-14 PROCEDURE — 99214 OFFICE O/P EST MOD 30 MIN: CPT | Mod: 25 | Performed by: PHYSICIAN ASSISTANT

## 2025-07-14 RX ORDER — CARVEDILOL 25 MG/1
25 TABLET ORAL 2 TIMES DAILY
Qty: 180 TABLET | Refills: 3 | Status: SHIPPED | OUTPATIENT
Start: 2025-07-14

## 2025-07-14 RX ORDER — LEVOTHYROXINE SODIUM 75 UG/1
75 TABLET ORAL DAILY
Qty: 90 TABLET | Refills: 3 | Status: SHIPPED | OUTPATIENT
Start: 2025-07-14

## 2025-07-14 RX ORDER — LEVOTHYROXINE SODIUM 75 UG/1
75 TABLET ORAL DAILY
Qty: 90 TABLET | Refills: 0 | Status: CANCELLED | OUTPATIENT
Start: 2025-07-14

## 2025-07-14 SDOH — HEALTH STABILITY: PHYSICAL HEALTH: ON AVERAGE, HOW MANY DAYS PER WEEK DO YOU ENGAGE IN MODERATE TO STRENUOUS EXERCISE (LIKE A BRISK WALK)?: 7 DAYS

## 2025-07-14 ASSESSMENT — SOCIAL DETERMINANTS OF HEALTH (SDOH): HOW OFTEN DO YOU GET TOGETHER WITH FRIENDS OR RELATIVES?: NEVER

## 2025-07-14 ASSESSMENT — PAIN SCALES - GENERAL: PAINLEVEL_OUTOF10: NO PAIN (0)

## 2025-07-14 NOTE — PATIENT INSTRUCTIONS
Hematology:  [phone]     To schedule your imaging: Call [phone]     Patient Education   Preventive Care Advice   This is general advice given by our system to help you stay healthy. However, your care team may have specific advice just for you. Please talk to your care team about your preventive care needs.  Nutrition  Eat 5 or more servings of fruits and vegetables each day.  Try wheat bread, brown rice and whole grain pasta (instead of white bread, rice, and pasta).  Get enough calcium and vitamin D. Check the label on foods and aim for 100% of the RDA (recommended daily allowance).  Lifestyle  Exercise at least 150 minutes each week  (30 minutes a day, 5 days a week).  Do muscle strengthening activities 2 days a week. These help control your weight and prevent disease.  No smoking.  Wear sunscreen to prevent skin cancer.  Have a dental exam and cleaning every 6 months.  Yearly exams  See your health care team every year to talk about:  Any changes in your health.  Any medicines your care team has prescribed.  Preventive care, family planning, and ways to prevent chronic diseases.  Shots (vaccines)   HPV shots (up to age 26), if you've never had them before.  Hepatitis B shots (up to age 59), if you've never had them before.  COVID-19 shot: Get this shot when it's due.  Flu shot: Get a flu shot every year.  Tetanus shot: Get a tetanus shot every 10 years.  Pneumococcal, hepatitis A, and RSV shots: Ask your care team if you need these based on your risk.  Shingles shot (for age 50 and up)  General health tests  Diabetes screening:  Starting at age 35, Get screened for diabetes at least every 3 years.  If you are younger than age 35, ask your care team if you should be screened for diabetes.  Cholesterol test: At age 39, start having a cholesterol test every 5 years, or more often if advised.  Bone density scan (DEXA): At age 50, ask your care team if you should have this scan for osteoporosis (brittle  bones).  Hepatitis C: Get tested at least once in your life.  STIs (sexually transmitted infections)  Before age 24: Ask your care team if you should be screened for STIs.  After age 24: Get screened for STIs if you're at risk. You are at risk for STIs (including HIV) if:  You are sexually active with more than one person.  You don't use condoms every time.  You or a partner was diagnosed with a sexually transmitted infection.  If you are at risk for HIV, ask about PrEP medicine to prevent HIV.  Get tested for HIV at least once in your life, whether you are at risk for HIV or not.  Cancer screening tests  Cervical cancer screening: If you have a cervix, begin getting regular cervical cancer screening tests starting at age 21.  Breast cancer scan (mammogram): If you've ever had breasts, begin having regular mammograms starting at age 40. This is a scan to check for breast cancer.  Colon cancer screening: It is important to start screening for colon cancer at age 45.  Have a colonoscopy test every 10 years (or more often if you're at risk) Or, ask your provider about stool tests like a FIT test every year or Cologuard test every 3 years.  To learn more about your testing options, visit:   .  For help making a decision, visit:   https://bit.ly/tv98249.  Prostate cancer screening test: If you have a prostate, ask your care team if a prostate cancer screening test (PSA) at age 55 is right for you.  Lung cancer screening: If you are a current or former smoker ages 50 to 80, ask your care team if ongoing lung cancer screenings are right for you.  For informational purposes only. Not to replace the advice of your health care provider. Copyright   2023 [facility]. All rights reserved. Clinically reviewed by the [facility]. [employer] [id number] - REV 01/24.  Learning About Activities of Daily Living  What are activities of daily living?     Activities of daily living (ADLs) are the basic  self-care tasks you do every day. These include eating, bathing, dressing, and moving around.  As you age, and if you have health problems, you may find that it's harder to do some of these tasks. If so, your doctor can suggest ideas that may help.  To measure what kind of help you may need, your doctor will ask how well you are able to do ADLs. Let your doctor know if there are any tasks that you are having trouble doing. This is an important first step to getting help. And when you have the help you need, you can stay as independent as possible.  How will a doctor assess your ADLs?  Asking about ADLs is part of a routine health checkup your doctor will likely do as you age. Your health check might be done in a doctor's office, in your home, or at a hospital. The goal is to find out if you are having any problems that could make it hard to care for yourself or that make it unsafe for you to be on your own.  To measure your ADLs, your doctor will ask how hard it is for you to do routine tasks. Your doctor may also want to know if you have changed the way you do a task because of a health problem. Your doctor may watch how you:  Walk back and forth.  Keep your balance while you stand or walk.  Move from sitting to standing or from a bed to a chair.  Button or unbutton a shirt or sweater.  Remove and put on your shoes.  It's common to feel a little worried or anxious if you find you can't do all the things you used to be able to do. Talking with your doctor about ADLs is a way to make sure you're as safe as possible and able to care for yourself as well as you can. You may want to bring a caregiver, friend, or family member to your checkup. They can help you talk to your doctor.  Follow-up care is a key part of your treatment and safety. Be sure to make and go to all appointments, and call your doctor if you are having problems. It's also a good idea to know your test results and keep a list of the medicines you  take.  Current as of: October 24, 2024  Content Version: 14.5 2024-2025 [employer].   Care instructions adapted under license by your healthcare professional. If you have questions about a medical condition or this instruction, always ask your healthcare professional. [employer] disclaims any warranty or liability for your use of this information.    Relationships for Good Health  Relationships are important for our health and happiness. Social isolation, loneliness and lack of support are bad for your health. Studies show that loneliness can harm health and limit your life span as much as high blood pressure and smoking.   Take some time to reflect on your relationships. Then answer these questions:  Are there people in your life that cause you stress or drain your energy? What can you do to set limits?  ________________________________________________________________________________________________________________________________________________________________________________________________________________________________________________________________________________________________________________________________________________  Who do you enjoy spending time with? Who can you go to for support?  ________________________________________________________________________________________________________________________________________________________________________________________________________________________________________________________________________________________________________________________________________________  What can you do to improve your relationships with  others?  __________________________________________________________________________________________________________________________________________________________________________________________________________________  ______________________________________________________________________________________________________________________________  What do you like most about your relationships with others?  ________________________________________________________________________________________________________________________________________________________________________________________________________________________________________________________________________________________________________________________________________________  My goal: ______________________________________________________________________  I will: ______________________________________________________________________________________________________________________________________________________________________________________________    For informational purposes only. Not to replace the advice of your health care provider. Copyright   2018 [facility]. All rights reserved. Clinically reviewed by [facility]  Team. [employer] [id number] - Rev 06/24.  Learning About Stress  What is stress?     Stress is your body's response to a hard situation. Your body can have a physical, emotional, or mental response. Stress is a fact of life for most people, and it affects everyone differently. What causes stress for you may not be stressful for someone else.  A lot of things can cause stress. You may feel stress when you go on a job interview, take a test, or run a race. This kind of short-term stress is normal and even useful. It can help you if you need to work hard or react quickly. For example, stress can help you finish an important job on time.  Long-term stress is caused by ongoing stressful situations or events. Examples  of long-term stress include long-term health problems, ongoing problems at work, or conflicts in your family. Long-term stress can harm your health.  How does stress affect your health?  When you are stressed, your body responds as though you are in danger. It makes hormones that speed up your heart, make you breathe faster, and give you a burst of energy. This is called the fight-or-flight stress response. If the stress is over quickly, your body goes back to normal and no harm is done.  But if stress happens too often or lasts too long, it can have bad effects. Long-term stress can make you more likely to get sick, and it can make symptoms of some diseases worse. If you tense up when you are stressed, you may develop neck, shoulder, or low back pain. Stress is linked to high blood pressure and heart disease.  Stress also harms your emotional health. It can make you barrera, tense, or depressed. Your relationships may suffer, and you may not do well at work or school.  What can you do to manage stress?  You can try these things to help manage stress:   Do something active. Exercise or activity can help reduce stress. Walking is a great way to get started. Even everyday activities such as housecleaning or yard work can help.  Try yoga or kamala chi. These techniques combine exercise and meditation. You may need some training at first to learn them.  Do something you enjoy. For example, listen to music or go to a movie. Practice your hobby or do volunteer work.  Meditate. This can help you relax, because you are not worrying about what happened before or what may happen in the future.  Do guided imagery. Imagine yourself in any setting that helps you feel calm. You can use online videos, books, or a teacher to guide you.  Do breathing exercises. For example:  From a standing position, bend forward from the waist with your knees slightly bent. Let your arms dangle close to the floor.  Breathe in slowly and deeply as you  "return to a standing position. Roll up slowly and lift your head last.  Hold your breath for just a few seconds in the standing position.  Breathe out slowly and bend forward from the waist.  Let your feelings out. Talk, laugh, cry, and express anger when you need to. Talking with supportive friends or family, a counselor, or a adalberto leader about your feelings is a healthy way to relieve stress. Avoid discussing your feelings with people who make you feel worse.  Write. It may help to write about things that are bothering you. This helps you find out how much stress you feel and what is causing it. When you know this, you can find better ways to cope.  What can you do to prevent stress?  You might try some of these things to help prevent stress:  Manage your time. This helps you find time to do the things you want and need to do.  Get enough sleep. Your body recovers from the stresses of the day while you are sleeping.  Get support. Your family, friends, and community can make a difference in how you experience stress.  Limit your news feed. Avoid or limit time on social media or news that may make you feel stressed.  Do something active. Exercise or activity can help reduce stress. Walking is a great way to get started.  Where can you learn more?  Go to https://www.[employer].net/patiented  Enter N032 in the search box to learn more about \"Learning About Stress.\"  Current as of: October 24, 2024  Content Version: 14.5 2024-2025 [employer].   Care instructions adapted under license by your healthcare professional. If you have questions about a medical condition or this instruction, always ask your healthcare professional. [employer] disclaims any warranty or liability for your use of this information.    Learning About Sleeping Well  What does sleeping well mean?     Sleeping well means getting enough sleep to feel good and stay healthy. How much sleep is enough varies among people.  The " number of hours you sleep and how you feel when you wake up are both important. If you do not feel refreshed, you probably need more sleep. Another sign of not getting enough sleep is feeling tired during the day.  Experts recommend that adults get at least 7 or more hours of sleep per day. Children and older adults need more sleep.  Why is getting enough sleep important?  Getting enough quality sleep is a basic part of good health. When your sleep suffers, your physical health, mood, and your thoughts can suffer too. You may find yourself feeling more grumpy or stressed. Not getting enough sleep also can lead to serious problems, including injury, accidents, anxiety, and depression.  What might cause poor sleeping?  Many things can cause sleep problems, including:  Changes to your sleep schedule.  Stress. Stress can be caused by fear about a single event, such as giving a speech. Or you may have ongoing stress, such as worry about work or school.  Depression, anxiety, and other mental or emotional conditions.  Changes in your sleep habits or surroundings. This includes changes that happen where you sleep, such as noise, light, or sleeping in a different bed. It also includes changes in your sleep pattern, such as having jet lag or working a late shift.  Health problems, such as pain, breathing problems, and restless legs syndrome.  Lack of regular exercise.  Using alcohol, nicotine, or caffeine before bed.  How can you help yourself?  Here are some tips that may help you sleep more soundly and wake up feeling more refreshed.  Your sleeping area   Use your bedroom only for sleeping and sex. A bit of light reading may help you fall asleep. But if it doesn't, do your reading elsewhere in the house. Try not to use your TV, computer, smartphone, or tablet while you are in bed.  Be sure your bed is big enough to stretch out comfortably, especially if you have a sleep partner.  Keep your bedroom quiet, dark, and cool. Use  "curtains, blinds, or a sleep mask to block out light. To block out noise, use earplugs, soothing music, or a \"white noise\" machine.  Your evening and bedtime routine   Create a relaxing bedtime routine. You might want to take a warm shower or bath, or listen to soothing music.  Go to bed at the same time every night. And get up at the same time every morning, even if you feel tired.  What to avoid   Limit caffeine (coffee, tea, caffeinated sodas) during the day, and don't have any for at least 6 hours before bedtime.  Avoid drinking alcohol before bedtime. Alcohol can cause you to wake up more often during the night.  Try not to smoke or use tobacco, especially in the evening. Nicotine can keep you awake.  Limit naps during the day, especially close to bedtime.  Avoid lying in bed awake for too long. If you can't fall asleep or if you wake up in the middle of the night and can't get back to sleep within about 20 minutes, get out of bed and go to another room until you feel sleepy.  Avoid taking medicine right before bed that may keep you awake or make you feel hyper or energized. Your doctor can tell you if your medicine may do this and if you can take it earlier in the day.  If you can't sleep   Imagine yourself in a peaceful, pleasant scene. Focus on the details and feelings of being in a place that is relaxing.  Get up and do a quiet or boring activity until you feel sleepy.  Avoid drinking any liquids before going to bed to help prevent waking up often to use the bathroom.  Where can you learn more?  Go to https://www.[employer].net/patiented  Enter J942 in the search box to learn more about \"Learning About Sleeping Well.\"  Current as of: July 31, 2024  Content Version: 14.5 2024-2025 [employer].   Care instructions adapted under license by your healthcare professional. If you have questions about a medical condition or this instruction, always ask your healthcare professional. [employer] disclaims any warranty or liability for your use of this information.    Bladder Training: Care Instructions  Your Care Instructions     Bladder training is used to treat urge incontinence and stress incontinence. Urge incontinence means that the need to urinate comes on so fast that you can't get to a toilet in time. Stress incontinence means that you leak urine because of pressure on your bladder. For example, it may happen when you laugh, cough, or lift something heavy.  Bladder training can increase how long you can wait before you have to urinate. It can also help your bladder hold more urine. And it can give you better control over the urge to urinate.  It is important to remember that bladder training takes a few weeks to a few months to make a difference. You may not see results right away, but don't give up.  Follow-up care is a key part of your treatment and safety. Be sure to make and go to all appointments, and call your doctor if you are having problems. It's also a good idea to know your test results and keep a list of the medicines you take.  How can you care for yourself at home?  Work with your doctor to come up with a bladder training program that is right for you. You may use one or more of the following methods.  Delayed urination  In the beginning, try to keep from urinating for 5 minutes after you first feel the need to go.  While you wait, take deep, slow breaths to relax. Kegel exercises can also help you delay the need to go to the bathroom.  After some practice, when you can easily wait 5 minutes to urinate, try to wait 10 minutes before you urinate.  Slowly increase the waiting period until you are able to control when you have to urinate.  Scheduled urination  Empty your bladder when you first wake up in the morning.  Schedule times throughout the day when you will urinate.  Start by going to the bathroom every hour, even if you don't need to go.  Slowly increase the time between  "trips to the bathroom.  When you have found a schedule that works well for you, keep doing it.  If you wake up during the night and have to urinate, do it. Apply your schedule to waking hours only.  Kegel exercises  These tighten and strengthen pelvic muscles, which can help you control the flow of urine. (If doing these exercises causes pain, stop doing them and talk with your doctor.) To do Kegel exercises:  Squeeze your muscles as if you were trying not to pass gas. Or squeeze your muscles as if you were stopping the flow of urine. Your belly, legs, and buttocks shouldn't move.  Hold the squeeze for 3 seconds, then relax for 5 to 10 seconds.  Start with 3 seconds, then add 1 second each week until you are able to squeeze for 10 seconds.  Repeat the exercise 10 times a session. Do 3 to 8 sessions a day.  When should you call for help?  Watch closely for changes in your health, and be sure to contact your doctor if:    Your incontinence is getting worse.     You do not get better as expected.   Where can you learn more?  Go to https://www.[employer].net/patiented  Enter V684 in the search box to learn more about \"Bladder Training: Care Instructions.\"  Current as of: April 30, 2024  Content Version: 14.5 2024-2025 [employer].   Care instructions adapted under license by your healthcare professional. If you have questions about a medical condition or this instruction, always ask your healthcare professional. [employer] disclaims any warranty or liability for your use of this information.       "

## 2025-07-14 NOTE — PROGRESS NOTES
"[facility]  [facility]  Alicia Che PA-C, Physician Assistant - Medical  Jul 14, 2025    Assessment & Plan     Routine general medical examination at a health care facility  - Lipid panel reflex to direct LDL Fasting  - Comprehensive metabolic panel (BMP + Alb, Alk Phos, ALT, AST, Total. Bili, TP)  - CBC with platelets    Visit for screening mammogram  Declines.     Screen for colon cancer  Declines.     Primary hypertension  Chronic, stable. Well managed.   - carvedilol (COREG) 25 MG tablet  Dispense: 180 tablet; Refill: 3  - Comprehensive metabolic panel (BMP + Alb, Alk Phos, ALT, AST, Total. Bili, TP)  - CBC with platelets    Hypothyroidism, unspecified type  Chronic. Recheck levels. Has gained weight unintentionally.   - TSH with free T4 reflex    Hypercholesteremia  - Lipid panel reflex to direct LDL Fasting    Subcutaneous mass of back  Suspect lipomas x2 lower left back. Recommend US for further evaluation   - US Soft Tissue Abdominal Wall or Lower Back    IgM monoclonal gammopathy of uncertain significance   Noted on workup done by neurology. Encouraged to schedule with heme/onc for further evaluation which she will consider. Expresses frustration with not getting anywhere with specialists in the past as well as the cost for these visits.     BMI  Estimated body mass index is 25.26 kg/m  as calculated from the following:    Height as of this encounter: 1.6 m (5' 3\").    Weight as of this encounter: 64.7 kg (142 lb 9.6 oz).   Weight management plan: Discussed healthy diet and exercise guidelines    The longitudinal plan of care for the diagnosis(es)/condition(s) as documented were addressed during this visit. Due to the added complexity in care, I will continue to support Judi in the subsequent management and with ongoing continuity of care.     Alicia Che PA-C on 7/14/2025 at 7:43 AM        Subjective   Judi is a 65 year old, presenting for the " following:  Physical (Fasting. )        7/14/2025     7:33 AM   Additional Questions   Roomed by Yumi MUELLER          Healthy Habits:     Taking medications regularly:  0  History of Present Illness       Hypertension: She presents for follow up of hypertension.  She does not check blood pressure  regularly outside of the clinic. Outpatient blood pressures have not been over 140/90. She does not follow a low salt diet.     Hypothyroidism:     Since last visit, patient describes the following symptoms::  Dry skin, Fatigue and Weight gain    Weight gain::  6-10 lbs.    Reason for visit:  Yearly examShe exercises with enough effort to increase her heart rate 20 to 29 minutes per day.  She exercises with enough effort to increase her heart rate 6 days per week.   She is taking medications regularly.    - mammo - declines  - colonoscopy declines    PMH reviewed:  - hypertension - on carvedilol 25 mg BID  - hypothyroidism - on synthroid 75 mcg daily  - hyperlipidemia - not on meds. Last   - migraines w/ aura - has seen [facility] of neurology in the past, last in 10/2022. Coreg for prevention as well as hypertension treatment, does not tolerate triptans    Advance Care Planning  Discussed advance care planning with patient; informed AVS has link to [facility].        7/14/2025   General Health   How would you rate your overall physical health? (!) FAIR   Feel stress (tense, anxious, or unable to sleep) To some extent   (!) STRESS CONCERN      7/14/2025   Nutrition   Diet: Regular (no restrictions)         7/14/2025   Exercise   Days per week of moderate/strenous exercise 7 days         7/14/2025   Social Factors   Frequency of gathering with friends or relatives Never   Worry food won't last until get money to buy more No   Food not last or not have enough money for food? No   Do you have housing? (Housing is defined as stable permanent housing and does not include staying outside in a car, in a tent, in an  abandoned building, in an overnight shelter, or couch-surfing.) Yes   Are you worried about losing your housing? No   Lack of transportation? No   Unable to get utilities (heat,electricity)? No   (!) SOCIAL CONNECTIONS CONCERN      7/14/2025   Fall Risk   Fallen 2 or more times in the past year? No    No   Trouble with walking or balance? No    No       Multiple values from one day are sorted in reverse-chronological order          7/14/2025   Activities of Daily Living- Home Safety   Needs help with the following daily activites None of the above   Safety concerns in the home Throw rugs in the hallway    No grab bars in the bathroom    No handrails on the stairs       Multiple values from one day are sorted in reverse-chronological order         7/14/2025   Dental   Dentist two times every year? Yes         7/14/2025   Hearing Screening   Hearing concerns? None of the above         7/14/2025   Driving Risk Screening   Patient/family members have concerns about driving No         7/14/2025   General Alertness/Fatigue Screening   Have you been more tired than usual lately? (!) YES         7/14/2025   Urinary Incontinence Screening   Bothered by leaking urine in past 6 months Yes     Today's PHQ-2 Score:       1/29/2025     9:54 AM   PHQ-2 ( 1999 [employer])   Q1: Little interest or pleasure in doing things 0   Q2: Feeling down, depressed or hopeless 0   PHQ-2 Score 0    Q1: Little interest or pleasure in doing things Not at all   Q2: Feeling down, depressed or hopeless Not at all   PHQ-2 Score 0       Patient-reported         7/14/2025   Substance Use   Alcohol more than 3/day or more than 7/wk No   Do you have a current opioid prescription? No   How severe/bad is pain from 1 to 10? 5/10   Do you use any other substances recreationally? No     Social History     Tobacco Use    Smoking status: Former     Types: Cigarettes     Passive exposure: Past    Smokeless tobacco: Never   Vaping Use    Vaping status: Some Days     Substances: Nicotine        Mammogram Screening - Mammogram every 1-2 years updated in Health Maintenance based on mutual decision making          5/24/2022    10:32 AM   PAP / HPV   PAP-ABSTRACT See Scanned Document      ASCVD Risk   The 10-year ASCVD risk score (Jaqueline VILLALOBOS, et al., 2019) is: 9.2%    Values used to calculate the score:      Age: 65 years      Sex: Female      Is Non- : No      Diabetic: No      Tobacco smoker: No      Systolic Blood Pressure: 129 mmHg      Is BP treated: Yes      HDL Cholesterol: 50 mg/dL      Total Cholesterol: 225 mg/dL    Reviewed and updated as needed this visit by Provider   Tobacco  Allergies  Meds  Problems  Med Hx  Surg Hx  Fam Hx            History reviewed. No pertinent past medical history.  Past Surgical History:   Procedure Laterality Date    MAMMOPLASTY AUGMENTATION      SINUS SURGERY       Current providers sharing in care for this patient include:  Patient Care Team:  [facility] Shirley Frio as PCP - General  Hussain Young MD as MD (Neurology)  Lexis Grimes PA-C as Physician Assistant (Dermatology)  Hussain Young MD as Assigned Neuroscience Provider  Shazia Chavira MD as MD (Hematology & Oncology)  Nina Gomez MD as Assigned PCP  Meghna Kirk PA-C as Assigned Dermatology Provider    The following health maintenance items are reviewed in Epic and correct as of today:  Health Maintenance   Topic Date Due    DEXA  Never done    ADVANCE CARE PLANNING  Never done    MAMMO SCREENING  Never done    COLORECTAL CANCER SCREENING  Never done    PNEUMOCOCCAL VACCINE 50+ YEARS (1 of 1 - PCV) Never done    ZOSTER VACCINE (1 of 2) Never done    LUNG CANCER SCREENING  Never done    COVID-19 VACCINE (5 - 2024-25 season) 09/01/2024    TSH W/FREE T4 REFLEX  05/10/2025    LIPID  06/24/2025    INFLUENZA VACCINE (1) 09/01/2025    BMP  09/11/2025    MEDICARE ANNUAL WELLNESS VISIT  07/14/2026     "ANNUAL REVIEW OF HM ORDERS  07/14/2026    FALL RISK ASSESSMENT  07/14/2026    DIABETES SCREENING  10/15/2027    DTAP/TDAP/TD VACCINE (2 - Td or Tdap) 03/15/2029    RSV VACCINE (1 - 1-dose 75+ series) 01/24/2034    HEPATITIS C SCREENING  Completed    PHQ-2 (once per calendar year)  Completed    HPV VACCINE  Aged Out    MENINGITIS VACCINE  Aged Out         Review of Systems  Constitutional, HEENT, cardiovascular, pulmonary, GI, , musculoskeletal, neuro, skin, endocrine and psych systems are negative, except as otherwise noted.     Objective    Exam  /74   Pulse 54   Temp 98.7  F (37.1  C) (Tympanic)   Resp 12   Ht 1.6 m (5' 3\")   Wt 64.7 kg (142 lb 9.6 oz)   SpO2 97%   BMI 25.26 kg/m     Estimated body mass index is 25.26 kg/m  as calculated from the following:    Height as of this encounter: 1.6 m (5' 3\").    Weight as of this encounter: 64.7 kg (142 lb 9.6 oz).    Physical Exam  GENERAL: alert and no distress  EYES: Eyes grossly normal to inspection, PERRL and conjunctivae and sclerae normal  HENT: ear canals and TM's normal, nose and mouth without ulcers or lesions  NECK: no adenopathy, no asymmetry, masses, or scars  RESP: lungs clear to auscultation - no rales, rhonchi or wheezes  CV: regular rate and rhythm, normal S1 S2, no S3 or S4, no murmur, click or rub, no peripheral edema  ABDOMEN: soft, nontender, no hepatosplenomegaly, no masses and bowel sounds normal  MS: no gross musculoskeletal defects noted, no edema. 2 subcutaneous soft, mobile lumps left lower back  SKIN: no suspicious lesions or rashes  NEURO: Normal strength and tone, mentation intact and speech normal  PSYCH: mentation appears normal, affect normal/bright         7/14/2025   Mini Cog   Clock Draw Score 2 Normal   3 Item Recall 3 objects recalled   Mini Cog Total Score 5         Signed Electronically by: Alicia Che PA-C    "

## 2025-07-30 ENCOUNTER — ANCILLARY PROCEDURE (OUTPATIENT)
Dept: ULTRASOUND IMAGING | Facility: CLINIC | Age: 65
End: 2025-07-30
Attending: PHYSICIAN ASSISTANT
Payer: COMMERCIAL

## 2025-07-30 DIAGNOSIS — R22.2 SUBCUTANEOUS MASS OF BACK: ICD-10-CM

## 2025-07-30 PROCEDURE — 76705 ECHO EXAM OF ABDOMEN: CPT
